# Patient Record
Sex: FEMALE | Race: ASIAN | NOT HISPANIC OR LATINO | Employment: FULL TIME | URBAN - METROPOLITAN AREA
[De-identification: names, ages, dates, MRNs, and addresses within clinical notes are randomized per-mention and may not be internally consistent; named-entity substitution may affect disease eponyms.]

---

## 2023-04-21 LAB
ALANINE AMINOTRANSFERASE (SGPT) (U/L) IN SER/PLAS: 16 U/L (ref 7–45)
ALBUMIN (G/DL) IN SER/PLAS: 4 G/DL (ref 3.4–5)
ALKALINE PHOSPHATASE (U/L) IN SER/PLAS: 45 U/L (ref 33–110)
ALPHA-1 FETOPROTEIN (NG/ML) IN SER/PLAS: <4 NG/ML (ref 0–9)
ASPARTATE AMINOTRANSFERASE (SGOT) (U/L) IN SER/PLAS: 13 U/L (ref 9–39)
BILIRUBIN DIRECT (MG/DL) IN SER/PLAS: 0.2 MG/DL (ref 0–0.3)
BILIRUBIN TOTAL (MG/DL) IN SER/PLAS: 0.9 MG/DL (ref 0–1.2)
HEPATITIS B VIRUS SURFACE AG PRESENCE IN SERUM: REACTIVE
PROTEIN TOTAL: 7 G/DL (ref 6.4–8.2)

## 2023-04-24 LAB — HEPATITIS DELTA VIRUS ANTIBODY: NEGATIVE

## 2023-06-13 ENCOUNTER — TELEPHONE (OUTPATIENT)
Dept: PRIMARY CARE | Facility: CLINIC | Age: 38
End: 2023-06-13

## 2023-07-18 ENCOUNTER — OFFICE VISIT (OUTPATIENT)
Dept: PRIMARY CARE | Facility: CLINIC | Age: 38
End: 2023-07-18
Payer: COMMERCIAL

## 2023-07-18 VITALS
RESPIRATION RATE: 16 BRPM | WEIGHT: 128.4 LBS | BODY MASS INDEX: 22.75 KG/M2 | TEMPERATURE: 97 F | HEART RATE: 58 BPM | OXYGEN SATURATION: 100 % | DIASTOLIC BLOOD PRESSURE: 52 MMHG | HEIGHT: 63 IN | SYSTOLIC BLOOD PRESSURE: 86 MMHG

## 2023-07-18 DIAGNOSIS — Z00.00 ROUTINE HISTORY AND PHYSICAL EXAMINATION OF ADULT: Primary | ICD-10-CM

## 2023-07-18 DIAGNOSIS — L70.9 ACNE, UNSPECIFIED ACNE TYPE: ICD-10-CM

## 2023-07-18 PROBLEM — R04.0 MILD EPISTAXIS: Status: ACTIVE | Noted: 2023-07-18

## 2023-07-18 PROBLEM — N64.4 BREAST PAIN, LEFT: Status: ACTIVE | Noted: 2023-07-18

## 2023-07-18 PROBLEM — H92.20 BLEEDING FROM EAR: Status: ACTIVE | Noted: 2023-07-18

## 2023-07-18 PROBLEM — N39.0 UTI (URINARY TRACT INFECTION): Status: ACTIVE | Noted: 2023-07-18

## 2023-07-18 PROBLEM — N94.10 DYSPAREUNIA, FEMALE: Status: ACTIVE | Noted: 2023-07-18

## 2023-07-18 PROBLEM — M99.09 SEGMENTAL AND SOMATIC DYSFUNCTION: Status: ACTIVE | Noted: 2023-07-18

## 2023-07-18 PROBLEM — R00.2 PALPITATIONS: Status: ACTIVE | Noted: 2023-07-18

## 2023-07-18 PROBLEM — B18.1 CHRONIC HEPATITIS B (MULTI): Status: ACTIVE | Noted: 2023-07-18

## 2023-07-18 PROBLEM — R30.0 DYSURIA: Status: ACTIVE | Noted: 2023-07-18

## 2023-07-18 PROBLEM — R10.13 EPIGASTRIC PAIN: Status: ACTIVE | Noted: 2023-07-18

## 2023-07-18 PROBLEM — R63.4 WEIGHT LOSS, UNINTENTIONAL: Status: ACTIVE | Noted: 2023-07-18

## 2023-07-18 PROBLEM — R00.0 RAPID HEART BEAT: Status: ACTIVE | Noted: 2023-07-18

## 2023-07-18 PROBLEM — R53.83 FATIGUE: Status: ACTIVE | Noted: 2023-07-18

## 2023-07-18 PROBLEM — M79.12 MYALGIA OF AUXILIARY MUSCLES, HEAD AND NECK: Status: ACTIVE | Noted: 2023-07-18

## 2023-07-18 PROBLEM — M79.9 POSTURAL STRAIN: Status: ACTIVE | Noted: 2023-07-18

## 2023-07-18 PROBLEM — M54.2 CERVICALGIA: Status: ACTIVE | Noted: 2023-07-18

## 2023-07-18 PROCEDURE — 99395 PREV VISIT EST AGE 18-39: CPT | Performed by: INTERNAL MEDICINE

## 2023-07-18 PROCEDURE — 1036F TOBACCO NON-USER: CPT | Performed by: INTERNAL MEDICINE

## 2023-07-18 RX ORDER — CLINDAMYCIN PHOSPHATE 10 MG/G
GEL TOPICAL 2 TIMES DAILY
COMMUNITY
Start: 2022-11-28

## 2023-07-18 RX ORDER — TRETINOIN 0.25 MG/G
CREAM TOPICAL
COMMUNITY
Start: 2022-11-03 | End: 2023-07-18 | Stop reason: ALTCHOICE

## 2023-07-18 RX ORDER — ENTECAVIR 0.5 MG/1
1 TABLET, FILM COATED ORAL DAILY
COMMUNITY
Start: 2018-06-18 | End: 2023-10-03 | Stop reason: SDUPTHER

## 2023-07-18 ASSESSMENT — ENCOUNTER SYMPTOMS
MUSCULOSKELETAL NEGATIVE: 1
HEMATOLOGIC/LYMPHATIC NEGATIVE: 1
PALPITATIONS: 0
NEUROLOGICAL NEGATIVE: 1
FREQUENCY: 0
SHORTNESS OF BREATH: 0
COUGH: 0
DYSURIA: 0
CONSTITUTIONAL NEGATIVE: 1
GASTROINTESTINAL NEGATIVE: 1
WHEEZING: 0
EYES NEGATIVE: 1

## 2023-07-18 ASSESSMENT — PATIENT HEALTH QUESTIONNAIRE - PHQ9
2. FEELING DOWN, DEPRESSED OR HOPELESS: NOT AT ALL
SUM OF ALL RESPONSES TO PHQ9 QUESTIONS 1 & 2: 0
1. LITTLE INTEREST OR PLEASURE IN DOING THINGS: NOT AT ALL

## 2023-07-18 ASSESSMENT — LIFESTYLE VARIABLES
SKIP TO QUESTIONS 9-10: 1
HOW OFTEN DO YOU HAVE SIX OR MORE DRINKS ON ONE OCCASION: NEVER
HOW OFTEN DO YOU HAVE A DRINK CONTAINING ALCOHOL: MONTHLY OR LESS
AUDIT-C TOTAL SCORE: 1
HOW MANY STANDARD DRINKS CONTAINING ALCOHOL DO YOU HAVE ON A TYPICAL DAY: 1 OR 2

## 2023-07-18 NOTE — PATIENT INSTRUCTIONS
Aidee was seen today for annual exam.  Diagnoses and all orders for this visit:  Routine history and physical examination of adult (Primary)  -     Basic Metabolic Panel; Future  -     CBC; Future  -     Lipid Panel; Future  -     TSH with reflex to Free T4 if abnormal; Future  Acne, unspecified acne type  -     FSH; Future  -     Luteinizing hormone; Future  -     Estradiol; Future  -     Progesterone; Future     F/U  in 1 yr or before if needed

## 2023-07-18 NOTE — PROGRESS NOTES
Subjective   Patient ID: Aidee Zamorano is a 37 y.o. female who presents for Annual Exam.  The patient is a 38 YO female who is being seen today for a physical. She is also requesting to have labs completed to check her hormones. She has a recent history Acne for which she was seen by Dermatology. She states that the Dermatologist informed her that the cause of her acne was unknown.  The patient believes that hormonal changes are causing her acne.        Review of Systems   Constitutional: Negative.    HENT: Negative.     Eyes: Negative.         Most recent eye exam was March 2023. Wears glasses.   Respiratory:  Negative for cough, shortness of breath and wheezing.    Cardiovascular:  Negative for chest pain, palpitations and leg swelling.   Gastrointestinal: Negative.    Genitourinary:  Negative for dysuria, frequency, vaginal bleeding and vaginal discharge.   Musculoskeletal: Negative.    Skin: Negative.    Neurological: Negative.    Hematological: Negative.        Objective   Physical Exam  Vitals reviewed.   Constitutional:       Appearance: Normal appearance. She is normal weight.   HENT:      Head: Normocephalic and atraumatic.      Right Ear: Tympanic membrane and external ear normal.      Left Ear: Tympanic membrane and external ear normal.   Eyes:      General: No scleral icterus.     Extraocular Movements: Extraocular movements intact.      Conjunctiva/sclera: Conjunctivae normal.      Pupils: Pupils are equal, round, and reactive to light.   Cardiovascular:      Rate and Rhythm: Regular rhythm.      Heart sounds: Normal heart sounds.   Pulmonary:      Effort: Pulmonary effort is normal.      Breath sounds: Normal breath sounds.   Abdominal:      General: Abdomen is flat. Bowel sounds are normal.      Palpations: Abdomen is soft.      Tenderness: There is no abdominal tenderness.   Musculoskeletal:      Cervical back: Normal range of motion and neck supple.   Lymphadenopathy:      Cervical: No cervical  adenopathy.   Skin:     General: Skin is warm and dry.      Findings: Lesion (facial acne on forehead cheeks  and chin) present.   Neurological:      General: No focal deficit present.      Mental Status: She is alert and oriented to person, place, and time.   Psychiatric:         Mood and Affect: Mood normal.         Behavior: Behavior normal.         Assessment/Plan   Aidee was seen today for annual exam.  Diagnoses and all orders for this visit:  Routine history and physical examination of adult (Primary)  -     Basic Metabolic Panel; Future  -     CBC; Future  -     Lipid Panel; Future  -     TSH with reflex to Free T4 if abnormal; Future  Acne, unspecified acne type  -     FSH; Future  -     Luteinizing hormone; Future  -     Estradiol; Future  -     Progesterone; Future     F/U 1 yr or before if needed

## 2023-07-25 ENCOUNTER — LAB (OUTPATIENT)
Dept: LAB | Facility: LAB | Age: 38
End: 2023-07-25
Payer: COMMERCIAL

## 2023-07-25 DIAGNOSIS — Z00.00 ROUTINE HISTORY AND PHYSICAL EXAMINATION OF ADULT: ICD-10-CM

## 2023-07-25 DIAGNOSIS — L70.9 ACNE, UNSPECIFIED ACNE TYPE: ICD-10-CM

## 2023-07-25 LAB
ANION GAP IN SER/PLAS: 11 MMOL/L (ref 10–20)
CALCIUM (MG/DL) IN SER/PLAS: 9.1 MG/DL (ref 8.6–10.6)
CARBON DIOXIDE, TOTAL (MMOL/L) IN SER/PLAS: 26 MMOL/L (ref 21–32)
CHLORIDE (MMOL/L) IN SER/PLAS: 107 MMOL/L (ref 98–107)
CHOLESTEROL (MG/DL) IN SER/PLAS: 172 MG/DL (ref 0–199)
CHOLESTEROL IN HDL (MG/DL) IN SER/PLAS: 55.5 MG/DL
CHOLESTEROL/HDL RATIO: 3.1
CREATININE (MG/DL) IN SER/PLAS: 0.77 MG/DL (ref 0.5–1.05)
ERYTHROCYTE DISTRIBUTION WIDTH (RATIO) BY AUTOMATED COUNT: 12.5 % (ref 11.5–14.5)
ERYTHROCYTE MEAN CORPUSCULAR HEMOGLOBIN CONCENTRATION (G/DL) BY AUTOMATED: 32.7 G/DL (ref 32–36)
ERYTHROCYTE MEAN CORPUSCULAR VOLUME (FL) BY AUTOMATED COUNT: 94 FL (ref 80–100)
ERYTHROCYTES (10*6/UL) IN BLOOD BY AUTOMATED COUNT: 4.26 X10E12/L (ref 4–5.2)
ESTRADIOL (PG/ML) IN SER/PLAS: 48 PG/ML
FOLLITROPIN (IU/L) IN SER/PLAS: 12.9 IU/L
GFR FEMALE: >90 ML/MIN/1.73M2
GLUCOSE (MG/DL) IN SER/PLAS: 90 MG/DL (ref 74–99)
HEMATOCRIT (%) IN BLOOD BY AUTOMATED COUNT: 40.1 % (ref 36–46)
HEMOGLOBIN (G/DL) IN BLOOD: 13.1 G/DL (ref 12–16)
LDL: 105 MG/DL (ref 0–99)
LEUKOCYTES (10*3/UL) IN BLOOD BY AUTOMATED COUNT: 5.6 X10E9/L (ref 4.4–11.3)
LUTEINIZING HORMONE (IU/ML) IN SER/PLAS: 5.6 IU/L
NRBC (PER 100 WBCS) BY AUTOMATED COUNT: 0 /100 WBC (ref 0–0)
PLATELETS (10*3/UL) IN BLOOD AUTOMATED COUNT: 231 X10E9/L (ref 150–450)
POTASSIUM (MMOL/L) IN SER/PLAS: 4 MMOL/L (ref 3.5–5.3)
PROGESTERONE (NG/ML) IN SER/PLAS: 0.5 NG/ML
SODIUM (MMOL/L) IN SER/PLAS: 140 MMOL/L (ref 136–145)
THYROTROPIN (MIU/L) IN SER/PLAS BY DETECTION LIMIT <= 0.05 MIU/L: 4.26 MIU/L (ref 0.44–3.98)
THYROXINE (T4) FREE (NG/DL) IN SER/PLAS: 1.06 NG/DL (ref 0.78–1.48)
TRIGLYCERIDE (MG/DL) IN SER/PLAS: 60 MG/DL (ref 0–149)
UREA NITROGEN (MG/DL) IN SER/PLAS: 10 MG/DL (ref 6–23)
VLDL: 12 MG/DL (ref 0–40)

## 2023-07-25 PROCEDURE — 80061 LIPID PANEL: CPT

## 2023-07-25 PROCEDURE — 85027 COMPLETE CBC AUTOMATED: CPT

## 2023-07-25 PROCEDURE — 83002 ASSAY OF GONADOTROPIN (LH): CPT

## 2023-07-25 PROCEDURE — 36415 COLL VENOUS BLD VENIPUNCTURE: CPT

## 2023-07-25 PROCEDURE — 80048 BASIC METABOLIC PNL TOTAL CA: CPT

## 2023-07-25 PROCEDURE — 83001 ASSAY OF GONADOTROPIN (FSH): CPT

## 2023-07-25 PROCEDURE — 84439 ASSAY OF FREE THYROXINE: CPT

## 2023-07-25 PROCEDURE — 82670 ASSAY OF TOTAL ESTRADIOL: CPT

## 2023-07-25 PROCEDURE — 84144 ASSAY OF PROGESTERONE: CPT

## 2023-07-25 PROCEDURE — 84443 ASSAY THYROID STIM HORMONE: CPT

## 2023-08-02 ENCOUNTER — APPOINTMENT (OUTPATIENT)
Dept: PRIMARY CARE | Facility: CLINIC | Age: 38
End: 2023-08-02
Payer: COMMERCIAL

## 2023-08-02 ENCOUNTER — TELEPHONE (OUTPATIENT)
Dept: PRIMARY CARE | Facility: CLINIC | Age: 38
End: 2023-08-02

## 2023-10-03 DIAGNOSIS — B18.1 CHRONIC VIRAL HEPATITIS B WITHOUT DELTA AGENT AND WITHOUT COMA (MULTI): ICD-10-CM

## 2023-10-03 RX ORDER — ENTECAVIR 0.5 MG/1
0.5 TABLET, FILM COATED ORAL DAILY
Qty: 90 TABLET | Refills: 3 | Status: SHIPPED | OUTPATIENT
Start: 2023-10-03

## 2023-10-16 PROBLEM — L85.3 XEROSIS CUTIS: Status: ACTIVE | Noted: 2021-05-05

## 2023-10-16 PROBLEM — L57.9 SKIN CHANGES DUE TO CHRONIC EXPOSURE TO NONIONIZING RADIATION, UNSPECIFIED: Status: ACTIVE | Noted: 2021-05-05

## 2023-10-16 PROBLEM — L23.89 ALLERGIC CONTACT DERMATITIS DUE TO OTHER AGENTS: Status: ACTIVE | Noted: 2021-05-05

## 2023-10-16 PROBLEM — L70.0 ACNE VULGARIS: Status: ACTIVE | Noted: 2021-05-05

## 2023-10-16 RX ORDER — BENZOYL PEROXIDE 50 MG/ML
LIQUID TOPICAL
COMMUNITY
Start: 2021-05-05 | End: 2023-10-30 | Stop reason: SDUPTHER

## 2023-10-16 RX ORDER — TRETINOIN 0.25 MG/G
1 CREAM TOPICAL
COMMUNITY
Start: 2021-05-05 | End: 2023-10-30 | Stop reason: SDUPTHER

## 2023-10-18 ENCOUNTER — OFFICE VISIT (OUTPATIENT)
Dept: DERMATOLOGY | Facility: CLINIC | Age: 38
End: 2023-10-18
Payer: COMMERCIAL

## 2023-10-18 DIAGNOSIS — L70.0 ACNE VULGARIS: ICD-10-CM

## 2023-10-18 DIAGNOSIS — L57.8 DIFFUSE PHOTODAMAGE OF SKIN: ICD-10-CM

## 2023-10-18 DIAGNOSIS — D48.5 NEOPLASM OF UNCERTAIN BEHAVIOR OF SKIN: Primary | ICD-10-CM

## 2023-10-18 DIAGNOSIS — L81.4 LENTIGO: ICD-10-CM

## 2023-10-18 PROCEDURE — 11301 SHAVE SKIN LESION 0.6-1.0 CM: CPT | Performed by: DERMATOLOGY

## 2023-10-18 PROCEDURE — 1036F TOBACCO NON-USER: CPT | Performed by: DERMATOLOGY

## 2023-10-18 PROCEDURE — 88305 TISSUE EXAM BY PATHOLOGIST: CPT | Mod: TC,DER | Performed by: DERMATOLOGY

## 2023-10-18 PROCEDURE — 99214 OFFICE O/P EST MOD 30 MIN: CPT | Performed by: DERMATOLOGY

## 2023-10-18 PROCEDURE — 88305 TISSUE EXAM BY PATHOLOGIST: CPT | Performed by: DERMATOLOGY

## 2023-10-18 RX ORDER — TRETINOIN 0.5 MG/G
1 CREAM TOPICAL NIGHTLY
Qty: 45 G | Refills: 11 | Status: SHIPPED | OUTPATIENT
Start: 2023-10-18

## 2023-10-18 RX ORDER — BENZOYL PEROXIDE 50 MG/ML
1 LIQUID TOPICAL DAILY
Qty: 227 G | Refills: 11 | Status: SHIPPED | OUTPATIENT
Start: 2023-10-18

## 2023-10-18 RX ORDER — CLINDAMYCIN PHOSPHATE 10 UG/ML
LOTION TOPICAL 2 TIMES DAILY
Qty: 60 ML | Refills: 11 | Status: SHIPPED | OUTPATIENT
Start: 2023-10-18 | End: 2023-10-30 | Stop reason: ALTCHOICE

## 2023-10-18 NOTE — PROGRESS NOTES
Subjective     Aidee Zamorano is a 38 y.o. female who presents for the following: Acne.  She states her acne improved significantly with use of BP wash, clindamycin 1% lotion, and tretinoin 0.025% cream as prescribed at her last visit in our office on 5/5/21.  However, she states she stopped using the medications in early 2022, and her acne began flaring again.  She then restarted clindamycin 1% lotion twice daily 2 months ago, but she is not currently using any other treatment.    She also notes a pink, raised bump on her right breast, which has been present for a few months and has increased in size.    Review of Systems:  No other skin or systemic complaints other than what is documented elsewhere in the note.    The following portions of the chart were reviewed this encounter and updated as appropriate:       Skin Cancer History  No skin cancer on file.    Specialty Problems          Dermatology Problems    Acne vulgaris    Skin changes due to chronic exposure to nonionizing radiation, unspecified    Xerosis cutis       Past Dermatologic / Past Relevant Medical History:    - history of acne  - no h/o skin cancer    Family History:    No family history of skin cancer    Social History:    The patient works as a  for a manufacturing company    Allergies:  Patient has no known allergies.    Current Medications / CAM's:    Current Outpatient Medications:     benzoyl peroxide 5 % external wash, APPLY EXTERNALLY TO THE AFFECTED AREA ONCE DAILY IN THE MORNING, Disp: , Rfl:     benzoyl peroxide 5 % lotion, 1 Application., Disp: , Rfl:     clindamycin (Clindagel) 1 % gel, Apply topically 2 times a day. to affected area, Disp: , Rfl:     entecavir (Baraclude) 0.5 mg tablet, Take 1 tablet (0.5 mg) by mouth once daily., Disp: 90 tablet, Rfl: 3    tretinoin (Retin-A) 0.025 % cream, 1 Application., Disp: , Rfl:      Objective   Well appearing patient in no apparent distress; mood and affect are within normal  limits.    A skin examination was performed including the face and neck and chest. All findings within normal limits unless otherwise noted below.    Assessment/Plan   1. Neoplasm of uncertain behavior of skin  Right Upper Breast  6 mm pink, shiny papule           Shave removal    Lesion length (cm):  0.6  Margin per side (cm):  0  Lesion diameter (cm):  0.6  Informed consent: discussed and consent obtained    Timeout: patient name, date of birth, surgical site, and procedure verified    Procedure prep:  Patient was prepped and draped  Anesthesia: the lesion was anesthetized in a standard fashion    Anesthetic:  1% lidocaine w/ epinephrine 1-100,000 local infiltration  Instrument used: flexible razor blade    Hemostasis achieved with: aluminum chloride    Outcome: patient tolerated procedure well    Post-procedure details: sterile dressing applied and wound care instructions given    Dressing type: bandage and petrolatum      Specimen 1 - Dermatopathology- DERM LAB  Differential Diagnosis: angioma v molluscum v BCC  Check Margins Yes/No?:    Comments:    Dermpath Lab: Routine Histopathology (formalin-fixed tissue)    2. Acne vulgaris  Head - Anterior (Face)  Scattered on the patient's face, there are multiple open and closed comedones; several erythematous, inflammatory papules and pustules; and several pink to hyperpigmented macules consistent with post-inflammatory hyperpigmentation    Acne Vulgaris - flaring on face; mild-moderate; mixed comedonal and inflammatory types.  The nature of this condition and treatment options were discussed extensively with the patient today.  At this time, I recommend combination topical therapy with Benzoyl Peroxide 5% creamy wash once daily in the morning; Clindamycin 1% lotion twice daily or up to 3-4 times per day as needed for active lesions; and Tretinoin 0.05% cream at night.  The risks, benefits, and side effects of these medications, including the redness, dryness, and  irritation expected with Tretinoin use, were discussed; the patient was instructed to begin use of Tretinoin 1-2 nights per week and increase to every other night, then every night as tolerated without excessive redness or irritation.  I also informed the patient it will likely take at least 2-3 months to notice any significant improvement with the treatment regimen outlined above [and she was instructed to discontinue use of these medications should she become or attempt to become pregnant at any time in the future].  The patient expressed understanding and is in agreement with this plan.    3. Lentigo  Photodistributed  Multiple tan- to light brown-colored, round- to oval-shaped, symmetric and uniform-appearing macules and small patches consistent with lentigines scattered in sun-exposed areas.    Solar Lentigines and photodamage.  The clinically benign-appearing nature of these lesions and their relation to chronic sun exposure were discussed with the patient today and reassurance provided.  None of these lesions meet threshold for biopsy today, and thus no treatment is medically indicated for these lesions at this time.  The signs and symptoms of skin cancer were reviewed and the patient was advised to practice sun protection and sun avoidance, use daily sunscreen, and perform regular self skin exams.  The patient was instructed to monitor these lesions for any changes, such as in size, shape, or color, or associated symptoms and to call our office to schedule a return visit for re-evaluation if any such changes or symptoms are noticed in the future.  The patient expressed understanding and is in agreement with this plan.    4. Diffuse photodamage of skin  Photodistributed  Diffuse photodamage with actinic changes with telangiectasia and mottled pigmentation in sun-exposed areas.    Photodamage.  The signs and symptoms of skin cancer were reviewed and the patient was advised to practice sun protection and sun  avoidance, use daily sunscreen, and perform regular self skin exams.  Sun protection was discussed, including avoiding the mid-day sun, wearing a sunscreen with SPF at least 50, and stressing the need for reapplication of sunscreen and applying more than they think they need.

## 2023-10-20 LAB
LABORATORY COMMENT REPORT: NORMAL
PATH REPORT.FINAL DX SPEC: NORMAL
PATH REPORT.GROSS SPEC: NORMAL
PATH REPORT.MICROSCOPIC SPEC OTHER STN: NORMAL
PATH REPORT.RELEVANT HX SPEC: NORMAL
PATH REPORT.TOTAL CANCER: NORMAL

## 2023-10-30 ENCOUNTER — OFFICE VISIT (OUTPATIENT)
Dept: ENDOCRINOLOGY | Facility: CLINIC | Age: 38
End: 2023-10-30
Payer: COMMERCIAL

## 2023-10-30 VITALS
HEART RATE: 80 BPM | BODY MASS INDEX: 22.93 KG/M2 | WEIGHT: 129.4 LBS | SYSTOLIC BLOOD PRESSURE: 98 MMHG | DIASTOLIC BLOOD PRESSURE: 64 MMHG | HEIGHT: 63 IN | RESPIRATION RATE: 14 BRPM

## 2023-10-30 DIAGNOSIS — E03.9 HYPOTHYROIDISM, UNSPECIFIED TYPE: Primary | ICD-10-CM

## 2023-10-30 PROCEDURE — 99204 OFFICE O/P NEW MOD 45 MIN: CPT | Performed by: INTERNAL MEDICINE

## 2023-10-30 PROCEDURE — 1036F TOBACCO NON-USER: CPT | Performed by: INTERNAL MEDICINE

## 2023-10-30 ASSESSMENT — ENCOUNTER SYMPTOMS
HEADACHES: 0
COUGH: 0
FEVER: 0
FATIGUE: 0
SHORTNESS OF BREATH: 0
VOMITING: 0
DIARRHEA: 0
CHILLS: 0
PALPITATIONS: 0
NAUSEA: 0

## 2023-10-30 NOTE — PATIENT INSTRUCTIONS
Blood work in the near future  Plan based on results  Please call or message with concerns or questions

## 2023-10-30 NOTE — PROGRESS NOTES
"Subjective   Patient ID: Aidee Zamorano is a 38 y.o. female who presents for Thyroid Problem. Patient states TSH was abnormal in last blood work at physical. Patient is a self referral.   HPI  38-year-old self-referred for evaluation of hypothyroidism.  She was in for routine physical with her primary care in July and her TSH was noted to be mildly elevated at 4.2.  Her TSH 2 years ago was 2.85.  She denies any previous personal or family history of thyroid disorders.  She has been feeling well.  Her energy levels been fine.  The only thing she has noticed is a slight increase in her weight with a recent visit by a family member and that it is more difficult to lose weight than it has been in the past.  She has regular menstrual cycles.  She has chronic acne issues which have not changed recently.    Review of Systems   Constitutional:  Negative for chills, fatigue and fever.   Respiratory:  Negative for cough and shortness of breath.    Cardiovascular:  Negative for chest pain and palpitations.   Gastrointestinal:  Negative for diarrhea, nausea and vomiting.   Neurological:  Negative for headaches.       Objective    10/30/2023 10:31 AM   BP 98/64   Pulse 80   Resp 14   Weight 58.7 kg (129 lb 6.4 oz)   Height 1.6 m (5' 3\")       Physical Exam  Constitutional:       Appearance: Normal appearance. She is normal weight.   HENT:      Head: Normocephalic and atraumatic.   Neck:      Thyroid: Thyromegaly present. No thyroid mass or thyroid tenderness.      Comments: Mildly enlarged thyroid gland no palpable nodules  Cardiovascular:      Rate and Rhythm: Normal rate and regular rhythm.      Heart sounds: No murmur heard.     No gallop.   Pulmonary:      Effort: Pulmonary effort is normal.      Breath sounds: Normal breath sounds.   Abdominal:      Palpations: Abdomen is soft.      Comments: benign   Neurological:      General: No focal deficit present.      Mental Status: She is alert and oriented to person, place, and time.     "  Deep Tendon Reflexes: Reflexes are normal and symmetric.   Psychiatric:         Behavior: Behavior is cooperative.         Assessment/Plan   Problem List Items Addressed This Visit    None  Visit Diagnoses         Codes    Hypothyroidism, unspecified type    -  Primary E03.9    Relevant Orders    Thyroid Stimulating Hormone    Thyroxine, Free    Triiodothyronine, Free    Thyroid Peroxidase (TPO) Antibody        38-year-old here for evaluation of hypothyroidism.  We discussed her course and blood work in detail.  We discussed hypothyroidism and its treatment.  We went over subclinical hypothyroidism.  We did discuss the importance of treating subclinical hypothyroidism if fertility goals.  She is currently not pursuing pregnancy.  We will recheck her blood work plus Hashimoto's antibodies and reviewed Hashimoto's and evaluation over time.  We will call with her blood work results and then proceed with further evaluation and treatment.  I encouraged her to call or message with concerns or questions

## 2023-11-21 ENCOUNTER — APPOINTMENT (OUTPATIENT)
Dept: INTEGRATIVE MEDICINE | Facility: CLINIC | Age: 38
End: 2023-11-21
Payer: COMMERCIAL

## 2023-11-30 ENCOUNTER — LAB (OUTPATIENT)
Dept: LAB | Facility: LAB | Age: 38
End: 2023-11-30
Payer: COMMERCIAL

## 2023-11-30 DIAGNOSIS — E03.9 HYPOTHYROIDISM, UNSPECIFIED TYPE: ICD-10-CM

## 2023-11-30 PROCEDURE — 84443 ASSAY THYROID STIM HORMONE: CPT

## 2023-11-30 PROCEDURE — 84481 FREE ASSAY (FT-3): CPT

## 2023-11-30 PROCEDURE — 36415 COLL VENOUS BLD VENIPUNCTURE: CPT

## 2023-11-30 PROCEDURE — 86376 MICROSOMAL ANTIBODY EACH: CPT

## 2023-11-30 PROCEDURE — 84439 ASSAY OF FREE THYROXINE: CPT

## 2023-12-01 LAB
T3FREE SERPL-MCNC: 3 PG/ML (ref 2.3–4.2)
T4 FREE SERPL-MCNC: 0.95 NG/DL (ref 0.78–1.48)
THYROPEROXIDASE AB SERPL-ACNC: <28 IU/ML
TSH SERPL-ACNC: 3.04 MIU/L (ref 0.44–3.98)

## 2024-03-01 ENCOUNTER — OFFICE VISIT (OUTPATIENT)
Dept: OBSTETRICS AND GYNECOLOGY | Facility: CLINIC | Age: 39
End: 2024-03-01
Payer: COMMERCIAL

## 2024-03-01 ENCOUNTER — LAB (OUTPATIENT)
Dept: LAB | Facility: LAB | Age: 39
End: 2024-03-01
Payer: COMMERCIAL

## 2024-03-01 VITALS
DIASTOLIC BLOOD PRESSURE: 70 MMHG | HEIGHT: 63 IN | SYSTOLIC BLOOD PRESSURE: 103 MMHG | HEART RATE: 75 BPM | WEIGHT: 132.4 LBS | BODY MASS INDEX: 23.46 KG/M2

## 2024-03-01 DIAGNOSIS — Z00.00 HEALTHCARE MAINTENANCE: ICD-10-CM

## 2024-03-01 DIAGNOSIS — Z00.00 HEALTHCARE MAINTENANCE: Primary | ICD-10-CM

## 2024-03-01 LAB
HIV 1+2 AB+HIV1 P24 AG SERPL QL IA: NONREACTIVE
TREPONEMA PALLIDUM IGG+IGM AB [PRESENCE] IN SERUM OR PLASMA BY IMMUNOASSAY: NONREACTIVE

## 2024-03-01 PROCEDURE — 87624 HPV HI-RISK TYP POOLED RSLT: CPT | Mod: 59 | Performed by: STUDENT IN AN ORGANIZED HEALTH CARE EDUCATION/TRAINING PROGRAM

## 2024-03-01 PROCEDURE — 1036F TOBACCO NON-USER: CPT | Performed by: STUDENT IN AN ORGANIZED HEALTH CARE EDUCATION/TRAINING PROGRAM

## 2024-03-01 PROCEDURE — 87661 TRICHOMONAS VAGINALIS AMPLIF: CPT | Mod: 59 | Performed by: STUDENT IN AN ORGANIZED HEALTH CARE EDUCATION/TRAINING PROGRAM

## 2024-03-01 PROCEDURE — 88175 CYTOPATH C/V AUTO FLUID REDO: CPT | Mod: TC,GCY | Performed by: STUDENT IN AN ORGANIZED HEALTH CARE EDUCATION/TRAINING PROGRAM

## 2024-03-01 PROCEDURE — 99385 PREV VISIT NEW AGE 18-39: CPT | Performed by: STUDENT IN AN ORGANIZED HEALTH CARE EDUCATION/TRAINING PROGRAM

## 2024-03-01 PROCEDURE — 36415 COLL VENOUS BLD VENIPUNCTURE: CPT

## 2024-03-01 PROCEDURE — 87389 HIV-1 AG W/HIV-1&-2 AB AG IA: CPT

## 2024-03-01 PROCEDURE — 86780 TREPONEMA PALLIDUM: CPT

## 2024-03-01 PROCEDURE — 87800 DETECT AGNT MULT DNA DIREC: CPT | Performed by: STUDENT IN AN ORGANIZED HEALTH CARE EDUCATION/TRAINING PROGRAM

## 2024-03-01 ASSESSMENT — PAIN SCALES - GENERAL: PAINLEVEL: 0-NO PAIN

## 2024-03-01 NOTE — ASSESSMENT & PLAN NOTE
- Reviewed healthy lifestyle including well rounded diet and exercise (moderate intensity 150min/week; high intensity 75min/week)  - Immunizations: s/p HPV  - Pap collected, will follow up results with patient via myChart or mail  - STI testing at patient request - GC/CT, Trich, Syphilis, HIV. Pt report she has known Hep B and declines hepatitis testing  - Mammogram at age 41  - Considering oocyte preservation - Referral placed to [facility]

## 2024-03-01 NOTE — PROGRESS NOTES
"Aidee Zamorano is a 38 y.o.  female who is here for a routine exam.   PCP = Radha Luke MD    Subjective     Concerns today: none    Gyn History:  Menarche: age 13  Periods are regular every 28-30 days, lasting 5 days.  Dysmenorrhea: moderate, occurring throughout menses.   Cyclic symptoms include  acne, mood changes .  Sexual activity: not currently  Current contraception: none  Fertility Goals: unsure - considering oocyte preservation  STI History: none  Pap History: 2021 wnl    Preventative:  HPV vaccination: Yes s/p  Exercise: regularly exercises  Diet: well rounded  Seat Belt Use: always    Social History:  Living Situation: lives solo with dog  School/Employment: Works in finance  Substance:    Tob: none   EtOH: at most 1 drink/week   Other Substances: none  Safe at Home?: Yes  Depression Screen/Mood concerns: No         Objective   /70   Pulse 75   Ht 1.6 m (5' 3\")   Wt 60.1 kg (132 lb 6.4 oz)   LMP 2024 (Exact Date)   BMI 23.45 kg/m²   Physical Exam  Vitals reviewed. Exam conducted with a chaperone present.   Constitutional:       Appearance: Normal appearance.   HENT:      Head: Normocephalic.   Cardiovascular:      Rate and Rhythm: Normal rate and regular rhythm.   Pulmonary:      Effort: Pulmonary effort is normal. No respiratory distress.   Chest:   Breasts:     Right: Normal. No swelling, mass or skin change.      Left: Normal. No swelling, mass or skin change.   Abdominal:      General: There is no distension.      Palpations: Abdomen is soft. There is no mass.      Tenderness: There is no abdominal tenderness. There is no guarding or rebound.   Genitourinary:     Comments: Normal appearing external female genitalia. Vaginal mucosa normal appearing without lesions. Cervix nulliparous without lesions.   Lymphadenopathy:      Upper Body:      Right upper body: No supraclavicular, axillary or pectoral adenopathy.      Left upper body: No supraclavicular, axillary or pectoral " adenopathy.   Skin:     General: Skin is warm and dry.   Neurological:      General: No focal deficit present.      Mental Status: She is alert.   Psychiatric:         Mood and Affect: Mood normal.         Behavior: Behavior normal.         Thought Content: Thought content normal.         Judgment: Judgment normal.             Assessment/Plan      Aidee Zamorano is a 38 y.o.  female presenting today for annual exam with the following problems addressed:  Problem List Items Addressed This Visit       Healthcare maintenance - Primary    Current Assessment & Plan     - Reviewed healthy lifestyle including well rounded diet and exercise (moderate intensity 150min/week; high intensity 75min/week)  - Immunizations: s/p HPV  - Pap collected, will follow up results with patient via myChart or mail  - STI testing at patient request - GC/CT, Trich, Syphilis, HIV. Pt report she has known Hep B and declines hepatitis testing  - Mammogram at age 41  - Considering oocyte preservation - Referral placed to [facility]         Relevant Orders    HIV 1/2 Antigen/Antibody Screen with Reflex to Confirmation    Syphilis Screen with Reflex    THINPREP PAP TEST (>30)    Referral to Reproductive Endocrinology

## 2024-03-05 LAB
C TRACH RRNA SPEC QL NAA+PROBE: NEGATIVE
N GONORRHOEA DNA SPEC QL PROBE+SIG AMP: NEGATIVE
T VAGINALIS RRNA SPEC QL NAA+PROBE: NEGATIVE

## 2024-03-15 LAB
CYTOLOGY CMNT CVX/VAG CYTO-IMP: NORMAL
HPV HR 12 DNA GENITAL QL NAA+PROBE: NEGATIVE
HPV HR GENOTYPES PNL CVX NAA+PROBE: NEGATIVE
HPV16 DNA SPEC QL NAA+PROBE: NEGATIVE
HPV18 DNA SPEC QL NAA+PROBE: NEGATIVE
LAB AP HPV GENOTYPE QUESTION: YES
LAB AP HPV HR: NORMAL
LAB AP PAP ADDITIONAL TESTS: NORMAL
LABORATORY COMMENT REPORT: NORMAL
LMP START DATE: NORMAL
PATH REPORT.TOTAL CANCER: NORMAL

## 2024-06-14 ASSESSMENT — LIFESTYLE VARIABLES
HISTORY_ALCOHOL_USE: YES
TOBACCO_USE: NO

## 2024-06-17 ENCOUNTER — CONSULT (OUTPATIENT)
Dept: ENDOCRINOLOGY | Facility: CLINIC | Age: 39
End: 2024-06-17
Payer: COMMERCIAL

## 2024-06-17 VITALS
DIASTOLIC BLOOD PRESSURE: 63 MMHG | TEMPERATURE: 98.6 F | BODY MASS INDEX: 23.23 KG/M2 | HEART RATE: 67 BPM | HEIGHT: 63 IN | WEIGHT: 131.13 LBS | SYSTOLIC BLOOD PRESSURE: 95 MMHG

## 2024-06-17 DIAGNOSIS — Z13.71 SCREENING FOR GENETIC DISEASE CARRIER STATUS: ICD-10-CM

## 2024-06-17 DIAGNOSIS — Z01.83 ENCOUNTER FOR RH BLOOD TYPING: ICD-10-CM

## 2024-06-17 DIAGNOSIS — Z13.29 SCREENING FOR THYROID DISORDER: ICD-10-CM

## 2024-06-17 DIAGNOSIS — B18.1 CHRONIC HEPATITIS B (MULTI): ICD-10-CM

## 2024-06-17 DIAGNOSIS — Z31.89 ENCOUNTER FOR FERTILITY PLANNING: Primary | ICD-10-CM

## 2024-06-17 DIAGNOSIS — Z01.812 ENCOUNTER FOR PREPROCEDURAL LABORATORY EXAMINATION: ICD-10-CM

## 2024-06-17 DIAGNOSIS — O09.529 ANTEPARTUM MULTIGRAVIDA OF ADVANCED MATERNAL AGE (HHS-HCC): ICD-10-CM

## 2024-06-17 DIAGNOSIS — Q51.3 BICORNUATE UTERUS: ICD-10-CM

## 2024-06-17 DIAGNOSIS — Z11.59 ENCOUNTER FOR SCREENING FOR OTHER VIRAL DISEASES: ICD-10-CM

## 2024-06-17 DIAGNOSIS — Z11.3 SCREENING FOR STDS (SEXUALLY TRANSMITTED DISEASES): ICD-10-CM

## 2024-06-17 DIAGNOSIS — Z31.41 FERTILITY TESTING: ICD-10-CM

## 2024-06-17 DIAGNOSIS — Z00.00 HEALTHCARE MAINTENANCE: ICD-10-CM

## 2024-06-17 LAB
ABO GROUP (TYPE) IN BLOOD: NORMAL
ANTIBODY SCREEN: NORMAL
RH FACTOR (ANTIGEN D): NORMAL
TSH SERPL-ACNC: 3.75 MIU/L (ref 0.44–3.98)

## 2024-06-17 PROCEDURE — 87491 CHLMYD TRACH DNA AMP PROBE: CPT

## 2024-06-17 PROCEDURE — 86706 HEP B SURFACE ANTIBODY: CPT

## 2024-06-17 PROCEDURE — 87340 HEPATITIS B SURFACE AG IA: CPT

## 2024-06-17 PROCEDURE — 99214 OFFICE O/P EST MOD 30 MIN: CPT

## 2024-06-17 PROCEDURE — 36415 COLL VENOUS BLD VENIPUNCTURE: CPT

## 2024-06-17 PROCEDURE — 84443 ASSAY THYROID STIM HORMONE: CPT

## 2024-06-17 PROCEDURE — 86850 RBC ANTIBODY SCREEN: CPT

## 2024-06-17 ASSESSMENT — COLUMBIA-SUICIDE SEVERITY RATING SCALE - C-SSRS
2. HAVE YOU ACTUALLY HAD ANY THOUGHTS OF KILLING YOURSELF?: NO
6. HAVE YOU EVER DONE ANYTHING, STARTED TO DO ANYTHING, OR PREPARED TO DO ANYTHING TO END YOUR LIFE?: NO
1. IN THE PAST MONTH, HAVE YOU WISHED YOU WERE DEAD OR WISHED YOU COULD GO TO SLEEP AND NOT WAKE UP?: NO

## 2024-06-17 ASSESSMENT — PATIENT HEALTH QUESTIONNAIRE - PHQ9
SUM OF ALL RESPONSES TO PHQ9 QUESTIONS 1 AND 2: 0
2. FEELING DOWN, DEPRESSED OR HOPELESS: NOT AT ALL
1. LITTLE INTEREST OR PLEASURE IN DOING THINGS: NOT AT ALL

## 2024-06-17 ASSESSMENT — PAIN SCALES - GENERAL: PAINLEVEL: 0-NO PAIN

## 2024-06-17 NOTE — PROGRESS NOTES
Visit Type: In Person    NEW FERTILITY PATIENT VISIT    Referred by:  Dr. Mullen    Accompanied today by:  self    Aidee Zamorano is a 38 y.o.  female who presents to discuss egg preservation or possible IVF with donor Sperm,  h/o elevated TSH level  at 4.26 and repeat testing was negative in  & Chronic Hepatitis B- will have an appt with Dr. Emanuel this week    H/o bicornuate uterus- done in [place] 10 yrs ago    Have you had any concerns about your fertility treatments so far? Have you had any concerns about your fertility treatments so far: No     What are you goals for today's visit?     What causes of infertility have been identified on your workup so far?   no  Past Infertility Treatments: Have you undergone any treatments for fertility: No      Please summarize your fertility treatments to date.   no    PRIOR EVALUATION / TREATMENT: no      Hysterosalpingogram: no  Saline Infused Sonography: no  GYN Pelvic Ultrasound: no      Prior Labs  Lab Results    Date Done      AMH: No results found for requested labs within last 1825 days. No results found for requested labs within last 1825 days.   TSH: 3.04 (Ref range: 0.44 - 3.98 mIU/L) 2023   PRL: No results found for requested labs within last 1825 days. No results found for requested labs within last 1825 days.   Testosterone: No results found for requested labs within last 1825 days. No results found for requested labs within last 1825 days.   DHEAS: No results found for requested labs within last 1825 days. No results found for requested labs within last 1825 days.   FSH: 12.9 (Ref range: IU/L) 2023   17 OHP: No results found for requested labs within last 1825 days. No results found for requested labs within last 1825 days.   HgbA1c: No results found for requested labs within last 1825 days. No results found for requested labs within last 1825 days.   Hepatitis B surface antigen: REACTIVE (A; Ref range: NONREACTIVE) 2023    Hepatitis C antibody: No results found for requested labs within last 1825 days. No results found for requested labs within last 1825 days.   HIV ½ Antigen Antibody screen with reflex: Nonreactive (Ref range: Nonreactive) 3/1/2024   Syphilis screening with reflex: negative 3/1/2024   GC: Negative (Ref range: Negative) 3/1/2024   CT: Negative (Ref range: Negative) 3/1/2024   Type and Screen: No results found for requested labs within last 1825 days. No results found for requested labs within last 1825 days.   Rh: No results found for requested labs within last 1825 days. No results found for requested labs within last 1825 days.   Antibody: No results found for requested labs within last 1825 days. No results found for requested labs within last 1825 days.   Rubella: No results found for requested labs within last 1825 days. No results found for requested labs within last 1825 days.   Varicella: No results found for requested labs within last 1825 days. No results found for requested labs within last 1825 days.   Hemoglobin: No results found for requested labs within last 1825 days. No results found for requested labs within last 1825 days.   Hematocrit: No results found for requested labs within last 1825 days. No results found for requested labs within last 1825 days.   Creatinine: 0.77 (Ref range: 0.50 - 1.05 mg/dL) 2023   AST:13 (Ref range: 9 - 39 U/L) 2023   ALT:16 (Ref range: 7 - 45 U/L): 2023        Relationship Status:  single     OB Hx:      OB History          0    Para   0    Term   0       0    AB   0    Living   0         SAB   0    IAB   0    Ectopic   0    Multiple   0    Live Births   0                 GYN HISTORY   Have you ever been diagnosed with a sexually transmitted disease? Have you ever been diagnosed with a sexually transmitted disease?: No  Hepatitis B  Have you ever had Pelvic Inflammatory Disease? Have you ever had Pelvic Inflammatory Disease?: No  Have  you had an abnormal PAP smear? Have you had an abnormal PAP smear?: No  Date & Result of last PAP smear:   Lab Results   Component Value Date    FINALINTERP  03/01/2024         A. THINPREP PAP CERVIX, SCREENING -     Specimen Adequacy  Satisfactory for evaluation; endocervical/transformation zone component is present  Quality Indicator: Partially obscured by cytolysis    General Categorization  Negative for intraepithelial lesion or malignancy.    Descriptive Interpretation  Negative for intraepithelial lesion or malignancy              Have you ever had an abnormal Mammogram? Have you ever had an abnormal mammogram?: No  Date & result of your last mammogram:  no  Do you have pelvic pain? Do you have pelvic pain?: No  How many times per week do you have intercourse?  NA  Do you have pain with intercourse? Do you have pain with intercourse?: No  Do you use lubricants with intercourse?  NA  Do you have pain with bowel movements? Do you have pain with bowel movements?: No  Do you have pain with a full bladder? Do you have pain with a full bladder?: No    MENSTRUAL HISTORY  LMP: 6-7-2024  Menarche: If applicable, when was your first occurrence of menstruation?: 13 years old  Contraception:  No  Cycle length: What is the average number of days between menstrual cycles?: 26  Describe your bleeding: Describe your bleeding:: Average  Dysmenorrhea: Are your menstrual periods painful?: Yes       ENDOCRINE/INFERTILITY HISTORY  Duration of infertility: If applicable, what is the approximate date you began trying to get pregnant?: Not applicable  Coital Activity/week:  NA  Nipple Discharge: Do you experience any loss of milk or liquid discharge from the breasts?: No  Vision changes: Are you experiencing any vision changes?: No  Headaches: Are you experiencing headaches?: No  Excess hair growth: Are you experiencing persistent or worsening hair growth on the face, breasts or lower abdomen?: No  Excessive hair loss: Are you  experiencing loss of hair from your scalp?: No  Acne: Are you experiencing acne?: Yes  Oily skin: Oily skin?: Yes  Recent weight change  Weight gain: Are you experiencing any increase in weight?: No  Weight loss: Are you experiencing any decrease in weight?: No  Exercise more than 3 times a week: Exercise more than 3 times a week?: Yes      PMH  Past Medical History:   Diagnosis Date    Bicornate uterus 05/02/2017    Bicornate uterus    Chronic viral hepatitis B without delta-agent (Multi) 12/13/2022    Chronic hepatitis B    Other specified abnormal findings of blood chemistry 05/15/2019    Increased prolactin level        MEDICATIONS  Current Outpatient Medications on File Prior to Visit   Medication Sig Dispense Refill    benzoyl peroxide 5 % external wash Apply 1 Application topically once daily. In the morning 227 g 11    benzoyl peroxide 5 % lotion 1 Application.      clindamycin (Clindagel) 1 % gel Apply topically 2 times a day. to affected area      entecavir (Baraclude) 0.5 mg tablet Take 1 tablet (0.5 mg) by mouth once daily. 90 tablet 3    tretinoin (Retin-A) 0.05 % cream Apply 1 Application topically once daily at bedtime. Start 1-2 nights per week 1-2 weeks, inc to every other night, then every night as tolerated 45 g 11     No current facility-administered medications on file prior to visit.        PSH: no  History reviewed. No pertinent surgical history.     PSYCH HISTORY  Have you ever been diagnosed with a mental health Issue?: No  Have you ever been hospitalized for a mental health disorder?: No       SOCIAL HISTORY  Social History     Tobacco Use    Smoking status: Never     Passive exposure: Never    Smokeless tobacco: Never   Vaping Use    Vaping status: Never Used   Substance Use Topics    Alcohol use: Not Currently    Drug use: Never     Occupation: Your occupation:: Finance managar  Have you ever been incarcerated? Have you ever been incarcerated?: No  Do you have a history of domestic  "violence? Do you have a history of domestic violence?: No  Do you feel safe at home? Do you feel safe at home?: Yes  Do you have a history of any negative sexual experience such as incest or rape? Do you have a history of any negative sexual experience such as incest or rape?: No       PARTNER HISTORY: NA  Partner Name:    Partner :    Partner email:    Occupation:    Prior fertility history:    PMH:    PSH:    Smoking:   Alcohol Use:    Drug Use:    Medications:    Injuries:    STD: Have you ever been diagnosed with a sexually transmitted disease?: No  SA:    SA Results:      FAMILY HISTORY  No family history on file.    CANCER HISTORY    Breast:  no  Ovarian:  no  Colon:  no  Endometrial:  no    FAMILY VTE HISTORY  Family History of Blood Clots:  no    GENETIC HISTORY  Ethnic Background  Patient: Ethnic Background Patient:: [place]   Partner:  NA  Genetic Disease in Family  Patient: Patient: Defects and genetic syndromes? Please answer Yes, No or Unsure: No  Partner:    Birth Defects in Family  Patient: Patient: Birth defects? Please answer Yes, No or Unsure: No  Partner: NA    Genetic screening performed previously:  no     BMI:   BMI Readings from Last 1 Encounters:   24 23.23 kg/m²     VITALS:  BP 95/63   Pulse 67   Temp 37 °C (98.6 °F)   Ht 1.6 m (5' 3\")   Wt 59.5 kg (131 lb 2 oz)   LMP 2024 (Approximate)   BMI 23.23 kg/m²     ASSESSMENT   38 y.o.  female with desire for egg preservation vs IVF with donor sperm, suspected ovulation and the following pertinent medical issues: single, chronic hep b, and poss bicornuate.    Partner SA: NA    COUNSELING  We discussed causes of infertility including hormonal, egg quality issues, structural problems such as endometriosis, adhesions, or tubal problems, uterine factors such as polyps or fibroids, and sperm issues. Reviewed evaluation of such as well. We discussed various methods for achieving pregnancy in some detail including, ovulation " "induction, insemination, superovulation and IVF.    We discussed the impact of age on fertility. We discussed that a woman is born with all of the follicles that she will have in her lifetime and that these numbers progressively decrease as the patient reaches menopause. We discussed that women can remain fertile into their late 30’s and even early 40’s, however, chance for success is significantly lower for women who have infertility. We also discussed the higher rates of aneuploidy and miscarriage that occur as women age.    We discussed AMH testing and the patient's AMH level, if applicable.  AMH is considered favorable if >1 however this test has many limitations including poor predictive rates of pregnancy. In randomized control trials such as \"Time to conceive\" pts with low AMH levels (<0.7) has similar cumulative pregnancy rates compared with women that had normal AMH levels.  In the \"AMIGOS\" study, AMH did not predict pregnancy rates following OS/IUI for unexplained infertility. However,  AMH is a marker that is helpful to predict how a patient may respond or oocyte yields with FSH and ART treatments, but again there is conflicting data about how this affects pregnancy rates with ART.    We discussed options for fertility preservation including oocyte cryopreservation and embryo cryopreservation, and discussed advances in cryopreservation specifically the optimization of vitrification to enhance oocyte freezing and enhance the likelihood of pregnancies after thaw.  Discussed with patient the clinical data that currently exists about efficacy of oocyte cryopreservation as a strategy for fertility preservation and that this is an evolving area. At present several concepts have been demonstrated. Increased maternal age likely impacts total egg yield and likelihood of pregnancy after thaw. Offspring outcomes appear to be comparable for children conceived after IVF and oocyte freezing/thaw/IVF but research in " this area is evolving and much more outcomes data exists for children conceived after embryo cryo than oocyte cryo. An upper limit on duration of time that oocytes can be frozen and still result in a pregnancy has not been defined. There is no guarantee for a pregnancy with any amount of oocytes cryopreserved. Reviewed nature of stimulation, injectable hormones used, and monitoring process and the process of oocyte retrieval as an outpatient surgical procedure to harvest oocytes. Risks of this procedure include ovarian hyperstimulation syndrome, anesthesia risks during egg retrieval. Reviewed need to consult with financial specialists in our office to delineate coverage and costs.    FEMALE SUPPLEMENTS FOR EGG QUALITY  The supplements that are marketed for egg quality are:  1) Vitamin D - 4000 units daily  2) Co enzyme Q10- 600mg/day (also called ubiquinone)    DONOR SPERM FACILITIES*     For your convenience, below is a list of commonly used donor sperm facilities by patients at  [facility].   The facilities listed below adhere to CLIA guidelines and are FDA approved for .     [facility]   [facility]   [facility]   [facility]   [facility]   [facility]   [facility]     If the facility you wish to use is not listed, you may request a review by the  and third party manager. Approval is not guaranteed and is granted on a case-by-case basis.   The donor of your choice will be reviewed by our third party team. Upon approval, please confirm with your clinical team before shipping. Samples and/or donors that have not been approved may not be accepted.      * Please note that [facility] has no affiliation or agency relationship with any of the donor facilities listed above, and it disclaims any and all responsibility for  "tissue or other property transferred from such storage facilities.      Reviewed with pt that CMV is a common virus and that the Center for Disease Control estimates that over half of adults have been infected with CMV by age 40, most with no signs or symptoms. CMV is transmitted via body fluids (saliva, urine, blood, tears, semen, and breast milk).   For this reason the \"sperm source\" needs to be assessed for CMV. Although, the risk of CMV transmission from washed sperm and/or embryos is very low.   Reviewed with pt risk of Congenital CMV. Reviewed that babies born with CMV can have brain, liver, spleen, lung, and growth problems. The most common long-term health problem in babies born with congenital CMV infection is hearing loss.      A complete cycle includes all fresh and frozen-thawed embryo transfers resulting from one egg collection.  Your chance of having your first baby after the 1st complete cycle of IVF is: 41.21%. This means that out of 100 couples undertaking a 1st complete cycle, approximately 41 would have a baby. 2nd IVF cycle 58%.      Routine Testing  Fertility Center  STDs Within 1 year   Genetic carrier Waiver/Completed   T&S Within 1 year   AMH Within 1 year   TSH Within 1 year   Rubella/Varicella Within 5 years     PLAN  DR. Emanuel will order Hep B panel and Ultrasound liver  Hep C, HIV, Syphilis, GC/CT   Type & Screen  CMV IgG/IgM  TSH  AMH  Rubella/varicella  Myriad  SIS- call day 1 of next menses to schedule- poss bicornuate uterus  Schedule IVF Consult for egg preservation vs IVF with Donor Sperm  Schedule MFM for Chronic Hep B, AMA, & Bicornuate Uterus  Psych referral for Donor Sperm- DR. Amin  Financial consult  Will need to be cleared by Dr. Abdifatah Emanuel for Hep B  Chart to primary nurse for care coordination and patient check list/education  Enroll in Engaged MD  Take prenatal vitamins, vitamin D 2000 IUs daily  Discussed that PAP and mammogram must be updated if appropriate " based on age and clinical history and results received before treatment can begin- up to date for pap test  Discussed that treatment cannot proceed until checklist items are complete   See me for a follow up visit in 6 weeks or after all testing is complete    MD Completion:  Ectopic Risk: No  Medically Complex: Yes- chronic Hep B & possible bicornuate uterus (diagnosed in [place] 10 yrs ago, no documentation)      Sneha Schmitz CNP 06/17/24 9:24 AM

## 2024-06-18 LAB
C TRACH RRNA SPEC QL NAA+PROBE: NEGATIVE
CMV IGG AVIDITY SERPL IA-RTO: REACTIVE %
HCV AB SER QL: NONREACTIVE
HIV 1+2 AB+HIV1 P24 AG SERPL QL IA: NONREACTIVE
N GONORRHOEA DNA SPEC QL PROBE+SIG AMP: NEGATIVE
RUBV IGG SERPL IA-ACNC: 3.3 IA
RUBV IGG SERPL QL IA: POSITIVE
TREPONEMA PALLIDUM IGG+IGM AB [PRESENCE] IN SERUM OR PLASMA BY IMMUNOASSAY: NONREACTIVE
VARICELLA ZOSTER IGG INDEX: 1.2 IA
VZV IGG SER QL IA: POSITIVE

## 2024-06-19 LAB
CMV IGM SERPL-ACNC: <8 AU/ML
MIS SERPL-MCNC: 2.33 NG/ML (ref 0.18–11.71)

## 2024-06-20 ENCOUNTER — APPOINTMENT (OUTPATIENT)
Dept: GASTROENTEROLOGY | Facility: CLINIC | Age: 39
End: 2024-06-20
Payer: COMMERCIAL

## 2024-06-20 ENCOUNTER — TELEPHONE (OUTPATIENT)
Dept: DERMATOLOGY | Facility: CLINIC | Age: 39
End: 2024-06-20

## 2024-06-20 VITALS
WEIGHT: 128 LBS | HEART RATE: 76 BPM | HEIGHT: 63 IN | BODY MASS INDEX: 22.68 KG/M2 | OXYGEN SATURATION: 98 % | DIASTOLIC BLOOD PRESSURE: 58 MMHG | SYSTOLIC BLOOD PRESSURE: 100 MMHG

## 2024-06-20 DIAGNOSIS — B18.1 CHRONIC HEPATITIS B (MULTI): ICD-10-CM

## 2024-06-20 LAB
AFP SERPL-MCNC: <4 NG/ML (ref 0–9)
HBV SURFACE AB SER-ACNC: <3.1 MIU/ML
HBV SURFACE AG SERPL QL IA: REACTIVE

## 2024-06-20 PROCEDURE — 1036F TOBACCO NON-USER: CPT | Performed by: INTERNAL MEDICINE

## 2024-06-20 PROCEDURE — 99215 OFFICE O/P EST HI 40 MIN: CPT | Performed by: INTERNAL MEDICINE

## 2024-06-20 ASSESSMENT — PAIN SCALES - GENERAL: PAINLEVEL: 0-NO PAIN

## 2024-06-20 NOTE — PATIENT INSTRUCTIONS
Get labs today, in 6 months and in 12 months.    Get an ultrasound of the liver now, in 6 months and in 12 months.    Get a FibroScan ultrasound of the liver.    Continue the Entecavir.    See us back in about 1 year.

## 2024-06-20 NOTE — PROGRESS NOTES
HEPATOLOGY RETURN PATIENT VISIT    June 20, 2024      Dr. Radha Luke    Patient Name:  ISABEL ZAMORANO  Medical Record Number: [id number]  YOB: 1985    Dear Dr. Luke,    I had the pleasure of seeing Isabel Zamorano for follow-up evaluation in the [facility] ([place] [facility] office). History and physical examination was performed. Pertinent available laboratory, imaging, pathology results were reviewed.     She has not seen me in clinic since 12/13/2022.    History of Present Illness:   The patient is a 38 year old Chinese female who is referred for follow-up evaluation of hepatitis B.    The patient is from [place].  She was diagnosed with hepatitis B at age 4.  She reports that she was always hepatitis B e Ag negative. She reports that she was asymptomatic and required no therapy from age 4 until 2010.  In 2010, she was undergoing routine labs and was told that her ALT was approximately 600 and her hepatitis B DNA was greater than 10,000,000 IU/ml.  At that time she was placed on entecavir 0.5 mg daily.  She has remained on that medicine since September 2010.    She had been getting labs in [place] every 3 months.  She had also been getting intermittent ultrasounds.  She had never had a liver biopsy.    She has never had any manifestation of liver disease including no ascites, hepatic encephalopathy, variceal bleeding, or jaundice.     Her father has hepatitis B.  Her mother and her siblings are all negative for hepatitis B.  Her  was reportedly negative for hepatitis B. He has received his HBV vaccines and they have no children.    She moved to the [place] in 2015 to pursue an DADA degree at [employer].  She initially saw me in December 2015 to get refills of her entecavir.  At that time I did additional lab tests and an ultrasound.  We did refill her entecavir.    She had had some early satiety and dyspepsia over the last year.  She was seen by Veronica Waldron NP for this.  The  symptoms all resolved.    She saw me in clinic 9/2/2020.  At that time, I told her to get labs and an ultrasound every 6 months and see me back in 1 year.  Instead, she did not return for follow-up > 2 years later.  She saw me in clinic 12/13/2022.  I again told her to get labs and an ultrasound every 6 months and see me back in 1 year.  She now returns about 18 months later.    She is being seen by reproductive endocrinology and infertility for possible in vitro fertilization.  She came in today to discuss pregnancy and hepatitis B.    She presented today for follow-up evaluation.  She denied any specific liver-related complaints.  She remains on entecavir.  She reports that she is compliant with the medication.    Past Medical/Surgical History:   HBV.    Family History:   Dad with HBV.    Social History:   She denied alcohol, tobacco, intravenous drug use, blood transfusions, or tattoos.  She completed her DADA at [employer] and now works in [employer] in [place]. She is . Her ex- had been vaccinated against hepatitis B. She is currently in a sexual relationship.    Review of systems: As noted above.  She has had no nausea, vomiting, abdominal pain, or gastrointestinal bleeding.  No fever, chills, visual changes, auditory changes, shortness of breath, chest pain, GI bleeding, diarrhea, constipation, dysuria, depression, hematuria, musculoskeletal issues or rash.    Medical, Surgical, Family, and Social Histories  Past Medical History:   Diagnosis Date    Bicornate uterus 05/02/2017    Bicornate uterus    Chronic viral hepatitis B without delta-agent (Multi) 12/13/2022    Chronic hepatitis B    Other specified abnormal findings of blood chemistry 05/15/2019    Increased prolactin level       No past surgical history on file.    No family history on file.    Social History     Socioeconomic History    Marital status: Single     Spouse name: Not on file    Number of children: Not on file    Years of education:  "Not on file    Highest education level: Not on file   Occupational History    Not on file   Tobacco Use    Smoking status: Never     Passive exposure: Never    Smokeless tobacco: Never   Vaping Use    Vaping status: Never Used   Substance and Sexual Activity    Alcohol use: Not Currently    Drug use: Never    Sexual activity: Not on file   Other Topics Concern    Not on file   Social History Narrative    Not on file     Social Determinants of Health     Financial Resource Strain: Not on file   Food Insecurity: Not on file   Transportation Needs: Not on file   Physical Activity: Not on file   Stress: Not on file   Social Connections: Not on file   Intimate Partner Violence: Not on file   Housing Stability: Not on file       Allergies and Current Medications  No Known Allergies  Current Outpatient Medications   Medication Sig Dispense Refill    benzoyl peroxide 5 % external wash Apply 1 Application topically once daily. In the morning 227 g 11    benzoyl peroxide 5 % lotion 1 Application.      clindamycin (Clindagel) 1 % gel Apply topically 2 times a day. to affected area      entecavir (Baraclude) 0.5 mg tablet Take 1 tablet (0.5 mg) by mouth once daily. 90 tablet 3    tretinoin (Retin-A) 0.05 % cream Apply 1 Application topically once daily at bedtime. Start 1-2 nights per week 1-2 weeks, inc to every other night, then every night as tolerated 45 g 11     No current facility-administered medications for this visit.        Physical Exam  /58   Pulse 76   Ht 1.6 m (5' 3\")   Wt 58.1 kg (128 lb)   LMP 06/07/2024 (Approximate)   SpO2 98%   BMI 22.67 kg/m²       Physical Examination:   General Appearance: alert and in no acute distress.   HEENT: oropharynx without lesions. Anicteric sclerae.   Neck supple, nontender, without adenopathy, thyromegaly, or JVD.   Lungs clear to auscultation and percussion.   Heart RRR without murmurs, rubs, or gallops.   Abdomen: Soft, nontender, bowel sounds positive, without " obvious ascites. Liver and spleen not palpable.   Extremities full ROM, no atrophy, normal strength. No edema.   Skin reveals no lesions.  Neurological exam nonfocal, alert and oriented.  No asterixis.  No spider angiomata, or palmar erythema.     Labs 6/17/2024 syphilis negative, HIV negative, hepatitis C antibody negative.    Labs 3/1/2024 syphilis negative, HIV negative.    Labs 11/30/2023 TSH 3.04.    Labs 7/25/2023 WBC 5.6, hemoglobin 13.1, platelets 231, glucose 90, sodium 140, creatinine 0.77.    Labs 4/21/2023 AFP < 4, hepatitis B surface antigen positive, hepatitis delta antibody negative, protein 7, albumin 4, alk phos 45, bilirubin 0.9, AST 13, ALT 16.    Labs 8/12/2022 glucose 87, sodium 137, creatinine 0.75, cholesterol 193, , triglycerides 46, WBC 7.1, hemoglobin 13, platelets 231, hepatitis B DNA undetectable.    Labs 9/21/2021 hepatitis B DNA undetectable, protein 7.5, albumin 4.4, alk phos 50, bilirubin 0.6, AST 17, ALT 21.    Labs 8/3/2020 alpha-1 antitrypsin 130, protein 7.4, albumin 4.2, alkaline phosphatase 47, bilirubin 1.2, AST 12, ALT 10, hepatitis B surface antigen positive, hepatitis B surface antibody negative, hepatitis B DNA undetectable.    Labs 8/30/2019 HBV-DNA detected but < 20 IU/ml, AST 13, ALT 14.    Labs 12/20/2018 creatinine 0.7, glucose 96, protein 7.4, albumin 4.4, alkaline phosphatase 61, bilirubin 0.7, AST 12, ALT 14, INR 1.1, WBC 6.2, hemoglobin 14.1, platelets 281.    Labs 10/26/2018 hepatitis B DNA undetectable, AFP < 1, AST 13, ALT 15.    H. pylori breath test 10/26/2018 negative.    Labs 3/30/2018 hepatitis B DNA undetectable.    Labs 11/14/2017 HBV-DNA undetectable, cholesterol 192, triglycerides 63, glucose 92, creatinine 0.69, protein 6.9, albumin 4, alkaline phosphatase 51, bilirubin 0.8, AST 12, ALT 12.    Labs 5/3/2017 HBV-DNA detected but below the level of quantification.    Labs 9/9/2016 AFP < 1.    Labs 12/15/2015 HBV-DNA + but below the level of  quantification, HAV +, AFP 2, HCV -, HBsAb -, HBsAg +, glucose 89, creatinine 0.63, protein 8, albumin 4.1, alkaline phosphatase 76, bilirubin 0.8, AST 16, ALT 32.    Labs 8/28/2015 HBV-DNA -, AST 25, ALT 34, alk phos 76, bilirubin normal, protein 7.7, albumin 4.21.    Labs 2010 revealed hepatitis B surface antigen positive, hepatitis B core antibody positive, hepatitis B e antigen negative, hepatitis B e antigen antibody positive.    Ultrasound 9/20/2023:  Impression   NORMAL SONOGRAPHIC APPEARANCE OF THE RIGHT UPPER QUADRANT.         Ultrasound 4/9/2021 revealed an unremarkable ultrasound of the right upper quadrant.    US 8/31/2020:  IMPRESSION:  Unremarkable ultrasound of the right upper quadrant.    US 12/19/2019:  IMPRESSION:  Unremarkable ultrasound of the right upper quadrant.    US 2/7/2018:  IMPRESSION:  Unremarkable ultrasound of the liver.     US 3/15/2017:  IMPRESSION:  Unremarkable ultrasound of the liver.    US 12/23/2015:  IMPRESSION:     1. Unremarkable right upper quadrant ultrasound.    EGD 9/23/2019:  Impression:         - Normal esophagus.                      - Z-line regular, 40 cm from the incisors.                      - Non-erosive gastritis. Biopsied.                      - Normal examined duodenum.        Assessment/Plan:   Hepatitis B    The patient is referred to me for follow-up evaluation of hepatitis B.    As noted above, she is HBeAg negative.  Apparently, when she was felt to have a flare of her disease in [place] in 2010, she was started on entecavir 0.5 mg daily.  She has remained on that dose since 2010.  She was told that she would need to stay on this medication indefinitely.    Obviously, I cannot go back in time to determine exactly the circumstances of that flare.  It is possible this could have been an immunologic event and did not need therapy.  However, now that she has been on this therapy since 2010 and is doing well, I would just continue it indefinitely.  Obviously, if  she were to lose her surface antigen, we could consider trying to stop therapy.  I will check hepatitis B surface antigen and hepatitis B surface antibody annually.    I will also check hepatitis B DNA annually (although I would do it every 6 months for now in case she gets pregnant).    She should get labs every 6 months as well as an alpha-fetoprotein and ultrasound every 6 months.      I did remind her of the importance of compliance, especially with the liver cancer screening tests.  She has not been getting her labs or imaging studies on a consistent basis as noted above.    Her hepatitis C antibody was negative.  Her hepatitis D antibody was also negative.    She is immune to hepatitis A.    She should see me back on an annual basis.    She was asking if she can drink alcohol in moderation.  I told her that occasional alcohol should be fine.    I again spoke with her about household and sexual transmission of hepatitis B.  I did recommend that sexual partners be told about her hepatitis B and that they be recommended to discuss vaccination with their care providers.    She is currently exploring in vitro fertilization.  We spoke at length about the risk of spread to the unborn child.  In fact, with her hepatitis B DNA being essentially negative, the wrist should be very small.  Either way, the OB and/or neonatologist can give the baby hepatitis B immunoglobulin and vaccines at the time of birth.    We did talk about the safety of hepatitis B medications.  We do not have massive data on the safety of hepatitis B medications in pregnancy.  However, from what we know, there should be little risk associated with the hepatitis B medications during pregnancy.  Also, given the fact that she had a significant flare in 2010, I would not feel comfortable stopping her hepatitis B medication.  After our lengthy discussion, she has opted to stay on her Entecavir.    We have never done a FibroScan on her.  I will recommend  that we get one as a baseline study.    Thank you for allowing me to participate in the care of this patient. Please feel free to contact me with any questions regarding their care.     Sincerely,     Abdifatah Emanuel MD, FAASLD, FACG.   Medical Director, Hepatology  [facility]  [facility]    Professor of Medicine  Division of Gastroenterology and Liver Disease  [employer]    [address] [place] [id number]  Phone: [phone]  Fax: [phone].            This document was generated with a computerized dictation system.  Because of this, there could be errors in grammar and/or content.

## 2024-06-20 NOTE — TELEPHONE ENCOUNTER
Pt left VM that she will be starting IVF within few months of this year ,, wants to know when she should stop using Treinoin ?

## 2024-06-25 ENCOUNTER — DOCUMENTATION (OUTPATIENT)
Dept: ENDOCRINOLOGY | Facility: CLINIC | Age: 39
End: 2024-06-25
Payer: COMMERCIAL

## 2024-06-25 NOTE — PROGRESS NOTES
Message to patient regarding [facility] Screening:  Genetic carrier testing reviewed    [   ] Genetic carrier testing reviewed and POSITIVE result noted, indicating that the patient a carrier of one or more genetic conditions.     [ X  ] Genetic carrier testing reviewed and NEGATIVE result noted.    Additional actions:  [   ] Ok to proceed with next steps, no additional genetic testing or counseling recommended  [  X ] Awaiting partner testing  [   ] Partner testing reviewed and no concordance. Ok to proceed with planned treatments.   [  X ] Other: if patient is using donor sperm to fertilize eggs, then we will need to be sure that she is screened and negative for any genetic condition the sperm donor is a carrier for. Sneha Schmitz, MATIAS 06/25/24 3:01 PM

## 2024-06-27 ENCOUNTER — APPOINTMENT (OUTPATIENT)
Dept: OPHTHALMOLOGY | Facility: CLINIC | Age: 39
End: 2024-06-27
Payer: COMMERCIAL

## 2024-07-03 ENCOUNTER — LAB (OUTPATIENT)
Dept: LAB | Facility: LAB | Age: 39
End: 2024-07-03
Payer: COMMERCIAL

## 2024-07-03 DIAGNOSIS — B18.1 CHRONIC HEPATITIS B (MULTI): ICD-10-CM

## 2024-07-03 PROCEDURE — 87517 HEPATITIS B DNA QUANT: CPT

## 2024-07-03 PROCEDURE — 36415 COLL VENOUS BLD VENIPUNCTURE: CPT

## 2024-07-04 LAB
HBV DNA SERPL NAA+PROBE-ACNC: NOT DETECTED [IU]/ML
HBV DNA SERPL NAA+PROBE-LOG IU: NORMAL {LOG_IU}/ML

## 2024-07-15 ENCOUNTER — APPOINTMENT (OUTPATIENT)
Dept: PRIMARY CARE | Facility: CLINIC | Age: 39
End: 2024-07-15
Payer: COMMERCIAL

## 2024-07-15 VITALS
HEIGHT: 63 IN | WEIGHT: 132 LBS | DIASTOLIC BLOOD PRESSURE: 68 MMHG | BODY MASS INDEX: 23.39 KG/M2 | SYSTOLIC BLOOD PRESSURE: 97 MMHG

## 2024-07-15 DIAGNOSIS — Z00.00 HEALTH MAINTENANCE EXAMINATION: Primary | ICD-10-CM

## 2024-07-15 PROBLEM — L57.9 SKIN CHANGES DUE TO CHRONIC EXPOSURE TO NONIONIZING RADIATION, UNSPECIFIED: Status: RESOLVED | Noted: 2021-05-05 | Resolved: 2024-07-15

## 2024-07-15 PROBLEM — R30.0 DYSURIA: Status: RESOLVED | Noted: 2023-07-18 | Resolved: 2024-07-15

## 2024-07-15 PROBLEM — R00.0 RAPID HEART BEAT: Status: RESOLVED | Noted: 2023-07-18 | Resolved: 2024-07-15

## 2024-07-15 PROBLEM — N39.0 UTI (URINARY TRACT INFECTION): Status: RESOLVED | Noted: 2023-07-18 | Resolved: 2024-07-15

## 2024-07-15 PROBLEM — R00.2 PALPITATIONS: Status: RESOLVED | Noted: 2023-07-18 | Resolved: 2024-07-15

## 2024-07-15 PROBLEM — M99.09 SEGMENTAL AND SOMATIC DYSFUNCTION: Status: RESOLVED | Noted: 2023-07-18 | Resolved: 2024-07-15

## 2024-07-15 PROBLEM — R10.13 EPIGASTRIC PAIN: Status: RESOLVED | Noted: 2023-07-18 | Resolved: 2024-07-15

## 2024-07-15 PROBLEM — M79.12 MYALGIA OF AUXILIARY MUSCLES, HEAD AND NECK: Status: RESOLVED | Noted: 2023-07-18 | Resolved: 2024-07-15

## 2024-07-15 PROBLEM — M79.9 POSTURAL STRAIN: Status: RESOLVED | Noted: 2023-07-18 | Resolved: 2024-07-15

## 2024-07-15 PROBLEM — R53.83 FATIGUE: Status: RESOLVED | Noted: 2023-07-18 | Resolved: 2024-07-15

## 2024-07-15 PROBLEM — L85.3 XEROSIS CUTIS: Status: RESOLVED | Noted: 2021-05-05 | Resolved: 2024-07-15

## 2024-07-15 PROBLEM — R04.0 MILD EPISTAXIS: Status: RESOLVED | Noted: 2023-07-18 | Resolved: 2024-07-15

## 2024-07-15 PROBLEM — L70.0 ACNE VULGARIS: Status: RESOLVED | Noted: 2021-05-05 | Resolved: 2024-07-15

## 2024-07-15 PROBLEM — M54.2 CERVICALGIA: Status: RESOLVED | Noted: 2023-07-18 | Resolved: 2024-07-15

## 2024-07-15 PROBLEM — N94.10 DYSPAREUNIA, FEMALE: Status: RESOLVED | Noted: 2023-07-18 | Resolved: 2024-07-15

## 2024-07-15 PROBLEM — N64.4 BREAST PAIN, LEFT: Status: RESOLVED | Noted: 2023-07-18 | Resolved: 2024-07-15

## 2024-07-15 PROBLEM — H92.20 BLEEDING FROM EAR: Status: RESOLVED | Noted: 2023-07-18 | Resolved: 2024-07-15

## 2024-07-15 PROBLEM — R63.4 WEIGHT LOSS, UNINTENTIONAL: Status: RESOLVED | Noted: 2023-07-18 | Resolved: 2024-07-15

## 2024-07-15 PROCEDURE — 99395 PREV VISIT EST AGE 18-39: CPT

## 2024-07-15 PROCEDURE — 1036F TOBACCO NON-USER: CPT

## 2024-07-15 ASSESSMENT — LIFESTYLE VARIABLES: HOW MANY STANDARD DRINKS CONTAINING ALCOHOL DO YOU HAVE ON A TYPICAL DAY: PATIENT DOES NOT DRINK

## 2024-07-15 NOTE — PROGRESS NOTES
"Subjective   Patient ID: Aidee Zamorano is a 38 y.o. female who presents for Clinic new est.  HPI  38 year old female with PMH of hepatitis B presents today to establish care.   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     Diet: Adequate fruits and and vegetables, protein, calcium   Exercise: Ac Chi class 3-4 days a week.   Nicotine: None  ETOH: None  Drug use: None  Dental care: UTD, no major issues  Vision concerns: Glasses, vision stable  Hearing concerns: none    All systems have been reviewed and are negative for complaint other than those mentioned in the HPI.     BP 97/68 (BP Location: Right arm, Patient Position: Sitting, BP Cuff Size: Adult)   Ht 1.6 m (5' 3\")   Wt 59.9 kg (132 lb)   LMP 06/07/2024 (Approximate)   BMI 23.38 kg/m²    Objective   Physical Exam  Constitutional:       General: She is awake.      Appearance: Normal appearance.   HENT:      Head: Normocephalic and atraumatic.   Eyes:      Extraocular Movements: Extraocular movements intact.      Conjunctiva/sclera: Conjunctivae normal.      Pupils: Pupils are equal, round, and reactive to light.   Neck:      Thyroid: No thyroid mass or thyromegaly.      Vascular: No carotid bruit.   Cardiovascular:      Rate and Rhythm: Normal rate and regular rhythm.      Pulses: Normal pulses.      Heart sounds: S1 normal and S2 normal. No murmur heard.  Pulmonary:      Effort: Pulmonary effort is normal.      Breath sounds: Normal breath sounds.   Abdominal:      General: Abdomen is flat. Bowel sounds are normal.      Palpations: Abdomen is soft. There is no hepatomegaly, splenomegaly, mass or pulsatile mass.      Tenderness: There is no abdominal tenderness.   Musculoskeletal:      Cervical back: Normal range of motion and neck supple.      Right lower leg: No edema.      Left lower leg: No edema.   Skin:     General: Skin is warm and dry.      Capillary Refill: " Capillary refill takes less than 2 seconds.   Neurological:      General: No focal deficit present.      Mental Status: She is alert and oriented to person, place, and time.   Psychiatric:         Mood and Affect: Mood normal.         Behavior: Behavior normal. Behavior is cooperative.         Thought Content: Thought content normal.         Judgment: Judgment normal.     Aidee was seen today for clinic new est.  Diagnoses and all orders for this visit:  Health maintenance examination (Primary)  -    In usual state of health, no concerns today  - UTD immunizations  - UTD pap  - Start mammogram screening at 40, colon cancer screening at 45  - Continue exercise, balanced diet. No significant calcium source in diet, discussed starting supplement.   - Due for  lab work, ordered.   -  Lipid Panel; Future  -     Hemoglobin A1C; Future  -     CBC and Auto Differential; Future  -     Vitamin D 25-Hydroxy,Total (for eval of Vitamin D levels); Future  -     Tsh With Reflex To Free T4 If Abnormal; Future  -     Comprehensive metabolic panel; Future    Follow up in 1 year for annual physical or PRN

## 2024-07-16 ENCOUNTER — HOSPITAL ENCOUNTER (OUTPATIENT)
Dept: RADIOLOGY | Facility: HOSPITAL | Age: 39
Discharge: HOME | End: 2024-07-16
Payer: COMMERCIAL

## 2024-07-16 DIAGNOSIS — B18.1 CHRONIC HEPATITIS B (MULTI): ICD-10-CM

## 2024-07-16 PROCEDURE — 76705 ECHO EXAM OF ABDOMEN: CPT | Performed by: RADIOLOGY

## 2024-07-16 PROCEDURE — 76705 ECHO EXAM OF ABDOMEN: CPT

## 2024-07-29 ENCOUNTER — APPOINTMENT (OUTPATIENT)
Dept: ENDOCRINOLOGY | Facility: CLINIC | Age: 39
End: 2024-07-29
Payer: COMMERCIAL

## 2024-07-29 ENCOUNTER — APPOINTMENT (OUTPATIENT)
Dept: BEHAVIORAL HEALTH | Facility: CLINIC | Age: 39
End: 2024-07-29
Payer: COMMERCIAL

## 2024-07-29 DIAGNOSIS — F41.8 ANXIETY ABOUT HEALTH: Primary | ICD-10-CM

## 2024-07-29 PROCEDURE — 90791 PSYCH DIAGNOSTIC EVALUATION: CPT | Performed by: PSYCHOLOGIST

## 2024-07-29 NOTE — LETTER
"July 29, 2024     Sneha Schmitz, APRN-CNP  [address]  [facility] [address]    Patient: Aidee Zamorano   YOB: 1985   Date of Visit: 7/29/2024       Dear Dr. Sneha Schmitz, APRN-CNP:    Thank you for referring Aidee Zamorano to me for evaluation. Below are my notes for this consultation.  If you have questions, please do not hesitate to call me. I look forward to following your patient along with you.       Sincerely,     Blanca Amin, PhD      CC: Mary Faith, APRN-CNP  [facility] CLINICAL SUPPORT STAFF  ______________________________________________________________________________________    Psychosocial Consultation regarding Third Party Reproduction    Virtual visit with consent    Aidee is a 38 year old single woman requesting IVF using undisclosed donor sperm.  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  She is single and never pregnant. She would prefer to parent within a relationship but does not currently have a partner and is aware that her fertility is declining with age. Therefore she would like to undergo IVF using undisclosed donor sperm.  She may decide to wait 1 year for FET just in case she meets someone but also may choose to transfer an embryo sooner.  Aidee has concerns about the impact of using donor sperm and being raised by a single mother on the mental health of offspring. We discussed the impact of being single by choice vs other family constellations and I provided some resources on single mothers by choice and many books for children about single mothers and donor sperm.  She has not yet selected a sperm bank or donor but plans on choosing a CMV - donor that is well educated and over 5'7\".  She would prefer to know any history of mental illness if possible.  She denies any psychiatric history, substance abuse or history of physical or sexual abuse.  She will likely donate to research any additional embryos that are frozen should any remain once she " has completed her own family building.  Impression  It is my clinical opinion that Aidee Zamorano is able to give informed consent and has carefully considered the psychosocial issues inherent in this third party reproductive option.

## 2024-07-29 NOTE — PROGRESS NOTES
"Psychosocial Consultation regarding Third Party Reproduction    Virtual visit with consent    Aidee is a 38 year old single woman requesting IVF using undisclosed donor sperm.  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  She is single and never pregnant. She would prefer to parent within a relationship but does not currently have a partner and is aware that her fertility is declining with age. Therefore she would like to undergo IVF using undisclosed donor sperm.  She may decide to wait 1 year for FET just in case she meets someone but also may choose to transfer an embryo sooner.  Aidee has concerns about the impact of using donor sperm and being raised by a single mother on the mental health of offspring. We discussed the impact of being single by choice vs other family constellations and I provided some resources on single mothers by choice and many books for children about single mothers and donor sperm.  She has not yet selected a sperm bank or donor but plans on choosing a CMV - donor that is well educated and over 5'7\".  She would prefer to know any history of mental illness if possible.  She denies any psychiatric history, substance abuse or history of physical or sexual abuse.  She will likely donate to research any additional embryos that are frozen should any remain once she has completed her own family building.  Impression  It is my clinical opinion that Aidee Zamorano is able to give informed consent and has carefully considered the psychosocial issues inherent in this third party reproductive option.  "

## 2024-07-31 ENCOUNTER — OFFICE VISIT (OUTPATIENT)
Dept: ENDOCRINOLOGY | Facility: CLINIC | Age: 39
End: 2024-07-31
Payer: COMMERCIAL

## 2024-07-31 DIAGNOSIS — Q51.3 BICORNUATE UTERUS: ICD-10-CM

## 2024-07-31 DIAGNOSIS — Z01.812 ENCOUNTER FOR PREPROCEDURAL LABORATORY EXAMINATION: ICD-10-CM

## 2024-07-31 DIAGNOSIS — Q51.28 DIDELPHIC UTERUS: Primary | ICD-10-CM

## 2024-07-31 LAB — PREGNANCY TEST URINE, POC: NEGATIVE

## 2024-07-31 PROCEDURE — 76831 ECHO EXAM UTERUS: CPT | Performed by: NURSE PRACTITIONER

## 2024-07-31 PROCEDURE — 81025 URINE PREGNANCY TEST: CPT | Performed by: NURSE PRACTITIONER

## 2024-07-31 NOTE — PROGRESS NOTES
Patient ID: Aidee Zamorano is a 38 y.o. female.    Sonohysterogram    Date/Time: 7/31/2024 4:07 PM    Performed by: DARRIN Zepeda  Authorized by: DARRIN Zepeda    Consent:     Consent obtained:  Verbal and written    Consent given by:  Patient    Patient questions answered: yes      Patient agrees, verbalizes understanding, and wants to proceed: yes      Instructions and paperwork completed: yes    Pre-procedure:     Pre-procedure timeout performed: yes      Prepped with: Betadine    Procedure:     Cervix cleaned and prepped: yes      Catheter inserted: yes      Uterine cavity distended with saline: yes    Post-procedure:     No complications: no      Estimated blood loss (mL): minimal.    Post procedure instructions given to patient: yes      Patient tolerated procedure well with no complications: yes    Comments:      Prior to the procedure, the patient was counseled regarding risks, benefits, and alternatives.    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   Bubble Test performed: No  Additional Findings:   Follow up:  Follow up required, chart forwarded to primary MD.    Patient needs to make an MD follow up to discuss surgery. Also of note patient has a vaginal septum.   Will also need IVF consult.     Dr. Jackson in room to assist.     Fani Sandy 07/31/24 4:07 PM

## 2024-08-09 ENCOUNTER — LAB (OUTPATIENT)
Dept: LAB | Facility: LAB | Age: 39
End: 2024-08-09
Payer: COMMERCIAL

## 2024-08-09 DIAGNOSIS — Z00.00 HEALTH MAINTENANCE EXAMINATION: ICD-10-CM

## 2024-08-09 LAB
25(OH)D3 SERPL-MCNC: 46 NG/ML (ref 30–100)
ALBUMIN SERPL BCP-MCNC: 3.8 G/DL (ref 3.4–5)
ALP SERPL-CCNC: 45 U/L (ref 33–110)
ALT SERPL W P-5'-P-CCNC: 15 U/L (ref 7–45)
ANION GAP SERPL CALC-SCNC: 12 MMOL/L (ref 10–20)
AST SERPL W P-5'-P-CCNC: 14 U/L (ref 9–39)
BASOPHILS # BLD AUTO: 0.04 X10*3/UL (ref 0–0.1)
BASOPHILS NFR BLD AUTO: 0.6 %
BILIRUB SERPL-MCNC: 0.5 MG/DL (ref 0–1.2)
BUN SERPL-MCNC: 10 MG/DL (ref 6–23)
CALCIUM SERPL-MCNC: 8.8 MG/DL (ref 8.6–10.6)
CHLORIDE SERPL-SCNC: 106 MMOL/L (ref 98–107)
CHOLEST SERPL-MCNC: 181 MG/DL (ref 0–199)
CHOLESTEROL/HDL RATIO: 3.4
CO2 SERPL-SCNC: 24 MMOL/L (ref 21–32)
CREAT SERPL-MCNC: 0.65 MG/DL (ref 0.5–1.05)
EGFRCR SERPLBLD CKD-EPI 2021: >90 ML/MIN/1.73M*2
EOSINOPHIL # BLD AUTO: 0.24 X10*3/UL (ref 0–0.7)
EOSINOPHIL NFR BLD AUTO: 3.9 %
ERYTHROCYTE [DISTWIDTH] IN BLOOD BY AUTOMATED COUNT: 12.2 % (ref 11.5–14.5)
EST. AVERAGE GLUCOSE BLD GHB EST-MCNC: 105 MG/DL
GLUCOSE SERPL-MCNC: 93 MG/DL (ref 74–99)
HBA1C MFR BLD: 5.3 %
HCT VFR BLD AUTO: 40 % (ref 36–46)
HDLC SERPL-MCNC: 53.3 MG/DL
HGB BLD-MCNC: 13.1 G/DL (ref 12–16)
IMM GRANULOCYTES # BLD AUTO: 0.01 X10*3/UL (ref 0–0.7)
IMM GRANULOCYTES NFR BLD AUTO: 0.2 % (ref 0–0.9)
LDLC SERPL CALC-MCNC: 118 MG/DL
LYMPHOCYTES # BLD AUTO: 2.31 X10*3/UL (ref 1.2–4.8)
LYMPHOCYTES NFR BLD AUTO: 37.5 %
MCH RBC QN AUTO: 30.4 PG (ref 26–34)
MCHC RBC AUTO-ENTMCNC: 32.8 G/DL (ref 32–36)
MCV RBC AUTO: 93 FL (ref 80–100)
MONOCYTES # BLD AUTO: 0.36 X10*3/UL (ref 0.1–1)
MONOCYTES NFR BLD AUTO: 5.8 %
NEUTROPHILS # BLD AUTO: 3.2 X10*3/UL (ref 1.2–7.7)
NEUTROPHILS NFR BLD AUTO: 52 %
NON HDL CHOLESTEROL: 128 MG/DL (ref 0–149)
NRBC BLD-RTO: 0 /100 WBCS (ref 0–0)
PLATELET # BLD AUTO: 234 X10*3/UL (ref 150–450)
POTASSIUM SERPL-SCNC: 4 MMOL/L (ref 3.5–5.3)
PROT SERPL-MCNC: 7 G/DL (ref 6.4–8.2)
RBC # BLD AUTO: 4.31 X10*6/UL (ref 4–5.2)
SODIUM SERPL-SCNC: 138 MMOL/L (ref 136–145)
T4 FREE SERPL-MCNC: 1.08 NG/DL (ref 0.78–1.48)
TRIGL SERPL-MCNC: 48 MG/DL (ref 0–149)
TSH SERPL-ACNC: 4.43 MIU/L (ref 0.44–3.98)
VLDL: 10 MG/DL (ref 0–40)
WBC # BLD AUTO: 6.2 X10*3/UL (ref 4.4–11.3)

## 2024-08-09 PROCEDURE — 83036 HEMOGLOBIN GLYCOSYLATED A1C: CPT

## 2024-08-09 PROCEDURE — 84443 ASSAY THYROID STIM HORMONE: CPT

## 2024-08-09 PROCEDURE — 84439 ASSAY OF FREE THYROXINE: CPT

## 2024-08-09 PROCEDURE — 80061 LIPID PANEL: CPT

## 2024-08-09 PROCEDURE — 85025 COMPLETE CBC W/AUTO DIFF WBC: CPT

## 2024-08-09 PROCEDURE — 80053 COMPREHEN METABOLIC PANEL: CPT

## 2024-08-09 PROCEDURE — 82306 VITAMIN D 25 HYDROXY: CPT

## 2024-08-13 ENCOUNTER — TELEMEDICINE (OUTPATIENT)
Dept: ENDOCRINOLOGY | Facility: CLINIC | Age: 39
End: 2024-08-13
Payer: COMMERCIAL

## 2024-08-13 VITALS — BODY MASS INDEX: 22.68 KG/M2 | HEIGHT: 63 IN | WEIGHT: 128 LBS

## 2024-08-13 DIAGNOSIS — Z31.62 ENCOUNTER FOR FERTILITY PRESERVATION COUNSELING: Primary | ICD-10-CM

## 2024-08-13 PROCEDURE — 99214 OFFICE O/P EST MOD 30 MIN: CPT

## 2024-08-13 PROCEDURE — 3008F BODY MASS INDEX DOCD: CPT

## 2024-08-13 ASSESSMENT — PATIENT HEALTH QUESTIONNAIRE - PHQ9
1. LITTLE INTEREST OR PLEASURE IN DOING THINGS: NOT AT ALL
2. FEELING DOWN, DEPRESSED OR HOPELESS: NOT AT ALL
SUM OF ALL RESPONSES TO PHQ9 QUESTIONS 1 AND 2: 0

## 2024-08-13 ASSESSMENT — PAIN SCALES - GENERAL: PAINLEVEL: 0-NO PAIN

## 2024-08-13 NOTE — PROGRESS NOTES
Virtual or Telephone Consent: An interactive audio and video telecommunication system which permits real time communications between the patient (at the originating site) and provider (at the distant site) was utilized to provide this telehealth service    Follow Up Visit HPI    Patient is a 38 y.o.  female with presents to discuss egg preservation or possible IVF with donor Sperm,  h/o elevated TSH level  at 4.26 and repeat testing was negative in  & Chronic Hepatitis B- had an appt 2024 with Dr. Emanuel in Hepatology, presenting today for follow up visit to discuss test results and  treatment options.     Recently ^ TSH on 2024= 4.43- will reach back to PCP for treatment      H/o bicornuate uterus- diagnosed in [place] 10 yrs ago     PRIOR EVALUATION / TREATMENT: no      Prior Labs  Lab Results    Date Done      AMH: 2.333 (Ref range: 0.176 - 11.705 ng/mL) 2024   TSH: 4.43 (H; Ref range: 0.44 - 3.98 mIU/L)- will reach back to PCP for treatment  2024   PRL: No results found for requested labs within last 1825 days. No results found for requested labs within last 1825 days.   Testosterone: No results found for requested labs within last 1825 days. No results found for requested labs within last 1825 days.   DHEAS: No results found for requested labs within last 1825 days. No results found for requested labs within last 1825 days.   FSH: 12.9 (Ref range: IU/L) 2023   17 OHP: No results found for requested labs within last 1825 days. No results found for requested labs within last 1825 days.   HgbA1c: 5.3 (Ref range: see below %) 2024   CMV IgG: reactive  2024   CMV IgM: negative 2024   Hepatitis B surface antigen: Reactive (A; Ref range: Nonreactive) 2024   Hepatitis C antibody: Nonreactive (Ref range: Nonreactive) 2024   HIV ½ Antigen Antibody screen with reflex: Nonreactive (Ref range: Nonreactive) 2024   Syphilis screening with reflex: No results  found for requested labs within last 1825 days. 2024   GC: Negative (Ref range: Negative) 2024   CT: Negative (Ref range: Negative) 2024   Type and Screen: A 2024   Rh: POS 2024   Antibody: NEG (Ref range: ) 2024   Rubella: Positive (Ref range: Negative) 2024   Varicella: Positive (A; Ref range: Negative) 2024   Hemoglobin: No results found for requested labs within last 1825 days. No results found for requested labs within last 1825 days.   Hematocrit: No results found for requested labs within last 1825 days. No results found for requested labs within last 1825 days.   Creatinine: 0.65 (Ref range: 0.50 - 1.05 mg/dL) 2024   AST:14 (Ref range: 9 - 39 U/L) 2024   ALT:15 (Ref range: 7 - 45 U/L): 2024     Myriad: negative    Psych eval: 2024: cleared    Saw  Dr. Abdifatah Emanuel 2024: for Hep B- is cleared, needs FibroScan & there was discussion about the safety of hepatitis B medications.  no massive data on the safety of hepatitis B medications in pregnancy.  However, from what we know, there should be little risk associated with the hepatitis B medications during pregnancy.  Also, given the fact that she had a significant flare in , I would not feel comfortable stopping her hepatitis B medication.  After our lengthy discussion, she has opted to stay on her Entecavir.       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 Best visualized on 3D views. Possible polyp right uterine horn upon  instillation of saline. Vaginal septum on exam. Transabdominal images were acquired in addition to transvaginal images for optimal visualization of pelvic structures.  GYN Pelvic Ultrasound: with [facility]    Relationship Status:  single     OB Hx:      OB History            0    Para   0    Term   0        0    AB   0    Living   0           SAB   0    IAB   0    Ectopic   0    Multiple   0    Live Births   0                     GYN HISTORY   Have you ever been diagnosed with a sexually transmitted disease? Have you ever been diagnosed with a sexually transmitted disease?: No  Hepatitis B  Have you ever had Pelvic Inflammatory Disease? Have you ever had Pelvic Inflammatory Disease?: No  Have you had an abnormal PAP smear? Have you had an abnormal PAP smear?: No  Date & Result of last PAP smear:          Lab Results   Component Value Date     FINALINTERP   03/01/2024             A. THINPREP PAP CERVIX, SCREENING -      Specimen Adequacy  Satisfactory for evaluation; endocervical/transformation zone component is present  Quality Indicator: Partially obscured by cytolysis     General Categorization  Negative for intraepithelial lesion or malignancy.     Descriptive Interpretation  Negative for intraepithelial lesion or malignancy                  Have you ever had an abnormal Mammogram? Have you ever had an abnormal mammogram?: No  Date & result of your last mammogram:  no  Do you have pelvic pain? Do you have pelvic pain?: No  How many times per week do you have intercourse?  NA  Do you have pain with intercourse? Do you have pain with intercourse?: No  Do you use lubricants with intercourse?  NA  Do you have pain with bowel movements? Do you have pain with bowel movements?: No  Do you have pain with a full bladder? Do you have pain with a full bladder?: No     MENSTRUAL HISTORY  LMP: [id number]  Cycle length: What is the average number of days between menstrual cycles?: 26  Describe your bleeding: Describe your bleeding:: Average  Dysmenorrhea: Are your menstrual periods painful?: Yes        ENDOCRINE/INFERTILITY HISTORY  Duration of infertility: If applicable, what is the approximate date you began trying to get pregnant?: Not applicable  Coital Activity/week:  NA  Nipple Discharge: Do you experience any loss of  milk or liquid discharge from the breasts?: No  Vision changes: Are you experiencing any vision changes?: No  Headaches: Are you experiencing headaches?: No  Excess hair growth: Are you experiencing persistent or worsening hair growth on the face, breasts or lower abdomen?: No  Excessive hair loss: Are you experiencing loss of hair from your scalp?: No  Acne: Are you experiencing acne?: Yes  Oily skin: Oily skin?: Yes  Recent weight change  Weight gain: Are you experiencing any increase in weight?: No  Weight loss: Are you experiencing any decrease in weight?: No  Exercise more than 3 times a week: Exercise more than 3 times a week?: Yes       Past Medical History:   Diagnosis Date    Bicornate uterus 2017    Bicornate uterus    Chronic viral hepatitis B without delta-agent (Multi) 2022    Chronic hepatitis B    Other specified abnormal findings of blood chemistry 05/15/2019    Increased prolactin level     No past surgical history on file.  Current Outpatient Medications on File Prior to Visit   Medication Sig Dispense Refill    benzoyl peroxide 5 % external wash Apply 1 Application topically once daily. In the morning 227 g 11    clindamycin (Clindagel) 1 % gel Apply topically 2 times a day. to affected area      entecavir (Baraclude) 0.5 mg tablet Take 1 tablet (0.5 mg) by mouth once daily. 90 tablet 3    tretinoin (Retin-A) 0.05 % cream Apply 1 Application topically once daily at bedtime. Start 1-2 nights per week 1-2 weeks, inc to every other night, then every night as tolerated 45 g 11     No current facility-administered medications on file prior to visit.       BMI:   BMI Readings from Last 1 Encounters:   07/15/24 23.38 kg/m²     VITALS:  There were no vitals taken for this visit.  LMP: 2024    ASSESSMENT   38 y.o.  female with desire for egg preservation or possible IVF with donor Sperm, recent TSH 2024 elevated at 4.43, will reach back to PCP who ordered testing to start on  "Synthroid,  h/o Chronic Hepatitis B- had an appt 6- with Dr. Emanuel hepatology:      needs Hep B DNA every 6 months, hepatic function labs, AFP, & liver ultrasound every 6 months, seen annually,     She is currently exploring in vitro fertilization.  We spoke at length about the risk of spread to the unborn child.  In fact, with her hepatitis B DNA being essentially negative, the risk should be very small.  Either way, the OB and/or neonatologist can give the baby hepatitis B immunoglobulin and vaccines at the time of birth.     We did talk about the safety of hepatitis B medications.  We do not have massive data on the safety of hepatitis B medications in pregnancy.  However, from what we know, there should be little risk associated with the hepatitis B medications during pregnancy.  Also, given the fact that she had a significant flare in 2010, I would not feel comfortable stopping her hepatitis B medication.  After our lengthy discussion, she has opted to stay on her Entecavir.     We have never done a FibroScan on her.  I will recommend that we get one as a baseline study.  on Entecavir.    COUNSELING  We discussed AMH testing and the patient's AMH level, if applicable.  AMH is considered favorable if >1 however this test has many limitations including poor predictive rates of pregnancy. In randomized control trials such as \"Time to conceive\" pts with low AMH levels (<0.7) has similar cumulative pregnancy rates compared with women that had normal AMH levels.  In the \"AMIGOS\" study, AMH did not predict pregnancy rates following OS/IUI for unexplained infertility. However,  AMH is a marker that is helpful to predict how a patient may respond or oocyte yields with FSH and ART treatments, but again there is conflicting data about how this affects pregnancy rates with ART.    We discussed options for fertility preservation including oocyte cryopreservation and embryo cryopreservation, and discussed advances in " cryopreservation specifically the optimization of vitrification to enhance oocyte freezing and enhance the likelihood of pregnancies after thaw.  Discussed with patient the clinical data that currently exists about efficacy of oocyte cryopreservation as a strategy for fertility preservation and that this is an evolving area. At present several concepts have been demonstrated. Increased maternal age likely impacts total egg yield and likelihood of pregnancy after thaw. Offspring outcomes appear to be comparable for children conceived after IVF and oocyte freezing/thaw/IVF but research in this area is evolving and much more outcomes data exists for children conceived after embryo cryo than oocyte cryo. An upper limit on duration of time that oocytes can be frozen and still result in a pregnancy has not been defined. There is no guarantee for a pregnancy with any amount of oocytes cryopreserved. Reviewed nature of stimulation, injectable hormones used, and monitoring process and the process of oocyte retrieval as an outpatient surgical procedure to harvest oocytes. Risks of this procedure include ovarian hyperstimulation syndrome, anesthesia risks during egg retrieval. Reviewed need to consult with financial specialists in our office to delineate coverage and costs.    Routine Testing [facility]  STDs Within 1 year   Genetic carrier Waiver/Completed   T&S Within 1 year   AMH Within 1 year   TSH Within 1 year   Rubella/Varicella Within 5 years     BMI Testing [facility]  CBC Within 1 year   CMP Within 1 year   HgbA1c Within 1 year   Mag, Phos, Vit D <18 Within 1 year   MFM > 40  REQ   Wt loss consult > 40 OPT     PLAN  Saw  Dr. Abdifatah Emanuel 6-: for Hep B- he ordered a Fibroscan to be done & need to wait for AFP & Hep Function tests to result  Schedule FUV to discuss surgery with SIS result: polyp, septum, didelphys uterus- schedule an appt   Schedule IVF Consult for egg preservation vs IVF with  Donor Sperm- schedule an appt  Schedule MFM for Chronic Hep B, AMA, & Bicornuate Uterus if decides to conceive   Engaged MD  Take prenatal vitamins, vitamin D 2000 IUs daily  Discussed that treatment cannot proceed until checklist items are complete.   Additional testing for BMI < 18 or > 40: NA.  Chart to primary nurse for care coordination and patient check list/education.    MD Completion:  Ectopic Risk: No  Medically Complex: Yes- chronic Hep B & possible bicornuate uterus (diagnosed in [place] 10 yrs ago, no documentation)     Outstanding boarding pass items: Hepatitis testing, schedule surgery consult and IVF Consult: egg preservation vs IVF with donor sperm    Sneha Schmitz, MATIAS 08/14/24 11:06 PM

## 2024-09-17 ENCOUNTER — OFFICE VISIT (OUTPATIENT)
Dept: ENDOCRINOLOGY | Facility: CLINIC | Age: 39
End: 2024-09-17
Payer: COMMERCIAL

## 2024-09-17 VITALS
HEART RATE: 70 BPM | HEIGHT: 63 IN | DIASTOLIC BLOOD PRESSURE: 56 MMHG | SYSTOLIC BLOOD PRESSURE: 102 MMHG | BODY MASS INDEX: 23.11 KG/M2 | RESPIRATION RATE: 16 BRPM | WEIGHT: 130.4 LBS

## 2024-09-17 DIAGNOSIS — E03.9 HYPOTHYROIDISM, UNSPECIFIED TYPE: Primary | ICD-10-CM

## 2024-09-17 PROCEDURE — 99213 OFFICE O/P EST LOW 20 MIN: CPT | Performed by: INTERNAL MEDICINE

## 2024-09-17 PROCEDURE — 1036F TOBACCO NON-USER: CPT | Performed by: INTERNAL MEDICINE

## 2024-09-17 PROCEDURE — 3008F BODY MASS INDEX DOCD: CPT | Performed by: INTERNAL MEDICINE

## 2024-09-17 RX ORDER — LEVOTHYROXINE SODIUM 50 UG/1
50 TABLET ORAL DAILY
Qty: 90 TABLET | Refills: 1 | Status: SHIPPED | OUTPATIENT
Start: 2024-09-17 | End: 2025-09-17

## 2024-09-17 ASSESSMENT — ENCOUNTER SYMPTOMS
HEADACHES: 0
CHILLS: 0
VOMITING: 0
FATIGUE: 0
PALPITATIONS: 0
SHORTNESS OF BREATH: 0
DIARRHEA: 0
FEVER: 0
COUGH: 0
NAUSEA: 0

## 2024-09-17 NOTE — PROGRESS NOTES
Endocrinology: Follow up visit  Subjective   Patient ID: Aidee Zamorano is a 39 y.o. female who presents for Hypothyroidism and Thyroid Problem.    PCP: DARRIN Mckee    HPI  Here for follow up hypothyroidism.  Last seen almost a year ago.  At that time tsh improved and was normal so held off on medication.    She is now planning to start ivf soon and repeat tsh 4.4.  Advised by julita to start treatment.   She is here to discuss.  She is still very reluctant to start t4 but will if needed.  Feels fine, no complaints    Review of Systems   Constitutional:  Negative for chills, fatigue and fever.   Respiratory:  Negative for cough and shortness of breath.    Cardiovascular:  Negative for chest pain and palpitations.   Gastrointestinal:  Negative for diarrhea, nausea and vomiting.   Neurological:  Negative for headaches.       Patient Active Problem List   Diagnosis    Chronic hepatitis B (Multi)    Allergic contact dermatitis due to other agents        Home Meds:  Current Outpatient Medications   Medication Instructions    entecavir (BARACLUDE) 0.5 mg, oral, Daily        No Known Allergies     Objective   Vitals:    09/17/24 0804   BP: 102/56   Pulse: 70   Resp: 16      Vitals:    09/17/24 0804   Weight: 59.1 kg (130 lb 6.4 oz)      Body mass index is 23.1 kg/m².   Physical Exam  Constitutional:       Appearance: Normal appearance. She is normal weight.   HENT:      Head: Normocephalic and atraumatic.   Neck:      Thyroid: No thyroid mass, thyromegaly or thyroid tenderness.   Cardiovascular:      Rate and Rhythm: Normal rate and regular rhythm.      Heart sounds: No murmur heard.     No gallop.   Pulmonary:      Effort: Pulmonary effort is normal.      Breath sounds: Normal breath sounds.   Abdominal:      Palpations: Abdomen is soft.      Comments: benign   Neurological:      General: No focal deficit present.      Mental Status: She is alert and oriented to person, place, and time.      Deep Tendon Reflexes:  Reflexes are normal and symmetric.   Psychiatric:         Behavior: Behavior is cooperative.         Labs:  Lab Results   Component Value Date    HGBA1C 5.2 08/14/2024    TSH 4.43 (H) 08/09/2024    FREET4 1.08 08/09/2024      Lab Results   Component Value Date    THYROIDPAB <28 11/30/2023        Assessment/Plan   Assessment & Plan  Hypothyroidism, unspecified type    Discussed course and importance of normal thyroid levels prior to pregnancy.  We discussed that the fetus has no thyroid gland in the early parts of development and her thyroid levels should be well controlled to avoid possible developmental issues.  Also discussed pregnancy puts extra demands on the thyroid and can decrease levels further.   I would recommend starting t4,  she reluctantly agrees.   Will start levothyroxine 50 mcg and recheck in one month.  Advised her to let me know if she becomes pregnant as we will do monthly labs and adjusts as needed.  After pregnancy can discuss coming off it depending on requirements throughout pregnancy.  Follow up in 6 months      Electronically signed by:  Lyudmila Vargas MD 09/17/24 8:29 AM

## 2024-09-17 NOTE — PATIENT INSTRUCTIONS
Start thyroid medication 50 mcg daily in am on empty stomach  Recheck labs in one month  Please notify my office if you become pregnant  Follow up in 6 months

## 2024-12-05 ENCOUNTER — APPOINTMENT (OUTPATIENT)
Dept: ENDOCRINOLOGY | Facility: CLINIC | Age: 39
End: 2024-12-05
Payer: COMMERCIAL

## 2024-12-10 DIAGNOSIS — B18.1 CHRONIC VIRAL HEPATITIS B WITHOUT DELTA AGENT AND WITHOUT COMA (MULTI): ICD-10-CM

## 2024-12-10 RX ORDER — ENTECAVIR 0.5 MG/1
TABLET, FILM COATED ORAL
Qty: 90 TABLET | Refills: 3 | Status: SHIPPED | OUTPATIENT
Start: 2024-12-10

## 2025-07-12 ENCOUNTER — APPOINTMENT (OUTPATIENT)
Dept: RADIOLOGY | Facility: HOSPITAL | Age: 40
End: 2025-07-12
Payer: COMMERCIAL

## 2025-07-12 DIAGNOSIS — B18.1 CHRONIC HEPATITIS B (MULTI): ICD-10-CM

## 2025-07-12 PROCEDURE — 76705 ECHO EXAM OF ABDOMEN: CPT

## 2025-07-12 PROCEDURE — 76705 ECHO EXAM OF ABDOMEN: CPT | Performed by: STUDENT IN AN ORGANIZED HEALTH CARE EDUCATION/TRAINING PROGRAM

## 2025-07-15 ENCOUNTER — PATIENT MESSAGE (OUTPATIENT)
Dept: GASTROENTEROLOGY | Facility: CLINIC | Age: 40
End: 2025-07-15
Payer: COMMERCIAL

## 2025-07-26 ENCOUNTER — OFFICE VISIT (OUTPATIENT)
Dept: URGENT CARE | Age: 40
End: 2025-07-26
Payer: COMMERCIAL

## 2025-07-26 VITALS
HEART RATE: 87 BPM | TEMPERATURE: 98.4 F | RESPIRATION RATE: 18 BRPM | OXYGEN SATURATION: 96 % | SYSTOLIC BLOOD PRESSURE: 96 MMHG | DIASTOLIC BLOOD PRESSURE: 63 MMHG

## 2025-07-26 DIAGNOSIS — S40.022A CONTUSION OF LEFT UPPER EXTREMITY, INITIAL ENCOUNTER: ICD-10-CM

## 2025-07-26 DIAGNOSIS — S39.012A STRAIN OF FASCIA OF LOWER BACK: ICD-10-CM

## 2025-07-26 DIAGNOSIS — F43.10 PTSD (POST-TRAUMATIC STRESS DISORDER): Primary | ICD-10-CM

## 2025-07-26 DIAGNOSIS — B18.1 CHRONIC HEPATITIS B (MULTI): ICD-10-CM

## 2025-07-26 DIAGNOSIS — S80.12XA CONTUSION OF LEFT LOWER EXTREMITY, INITIAL ENCOUNTER: ICD-10-CM
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
## 2025-07-28 ENCOUNTER — ANCILLARY PROCEDURE (OUTPATIENT)
Dept: URGENT CARE | Age: 40
End: 2025-07-28
Payer: COMMERCIAL

## 2025-07-28 ENCOUNTER — OFFICE VISIT (OUTPATIENT)
Dept: URGENT CARE | Age: 40
End: 2025-07-28
Payer: COMMERCIAL

## 2025-07-28 VITALS
OXYGEN SATURATION: 98 % | DIASTOLIC BLOOD PRESSURE: 67 MMHG | SYSTOLIC BLOOD PRESSURE: 100 MMHG | HEART RATE: 82 BPM | TEMPERATURE: 98.2 F | RESPIRATION RATE: 16 BRPM | BODY MASS INDEX: 23.03 KG/M2 | WEIGHT: 130 LBS

## 2025-07-28 DIAGNOSIS — M54.50 LUMBAR PAIN: Primary | ICD-10-CM

## 2025-07-28 DIAGNOSIS — M62.830 MUSCLE SPASM OF BACK: ICD-10-CM

## 2025-07-28 DIAGNOSIS — M54.50 LUMBAR PAIN: ICD-10-CM

## 2025-07-28 PROCEDURE — 72100 X-RAY EXAM L-S SPINE 2/3 VWS: CPT

## 2025-07-28 PROCEDURE — 1036F TOBACCO NON-USER: CPT

## 2025-07-28 PROCEDURE — 99214 OFFICE O/P EST MOD 30 MIN: CPT

## 2025-07-28 RX ORDER — CYCLOBENZAPRINE HCL 10 MG
10 TABLET ORAL NIGHTLY PRN
Qty: 7 TABLET | Refills: 0 | Status: SHIPPED | OUTPATIENT
Start: 2025-07-28 | End: 2025-08-04

## 2025-07-28 RX ORDER — IBUPROFEN 600 MG/1
600 TABLET, FILM COATED ORAL EVERY 6 HOURS PRN
Qty: 20 TABLET | Refills: 0 | Status: SHIPPED | OUTPATIENT
Start: 2025-07-28 | End: 2025-08-02

## 2025-07-28 ASSESSMENT — ENCOUNTER SYMPTOMS: BACK PAIN: 1

## 2025-07-28 ASSESSMENT — PAIN SCALES - GENERAL: PAINLEVEL_OUTOF10: 8

## 2025-07-28 NOTE — LETTER
July 28, 2025     Patient: Aidee Zamorano   YOB: 1985   Date of Visit: 7/28/2025       To Whom It May Concern:    Aidee Zamorano was seen in my clinic on 7/28/2025 at 9:35 am. Please excuse Aidee for her absence from work on this day to make the appointment. She may return to work on 07/31/2025.    If you have any questions or concerns, please don't hesitate to call.         Sincerely,         ZEHRA Salinas-CNP        CC: No Recipients

## 2025-07-28 NOTE — PATIENT INSTRUCTIONS
You were seen at Urgent Care today for low back pain. Please treat as discussed. Please take medications as prescribed.  Do not take muscle relaxer while driving or participating active that requires your full attention as may make you drowsy . Monitor for red flags which we spoke about, If your symptoms change, worsen or become concerning in any way, please go to the emergency room immediately, otherwise you can followup with your PCP in 2-3 days as needed
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
## 2025-08-03 ENCOUNTER — OFFICE VISIT (OUTPATIENT)
Dept: URGENT CARE | Age: 40
End: 2025-08-03
Payer: COMMERCIAL

## 2025-08-03 VITALS
HEART RATE: 76 BPM | SYSTOLIC BLOOD PRESSURE: 99 MMHG | TEMPERATURE: 98.2 F | DIASTOLIC BLOOD PRESSURE: 63 MMHG | OXYGEN SATURATION: 99 % | RESPIRATION RATE: 20 BRPM

## 2025-08-03 DIAGNOSIS — H93.12 TINNITUS OF LEFT EAR: Primary | ICD-10-CM

## 2025-08-03 PROCEDURE — 99213 OFFICE O/P EST LOW 20 MIN: CPT

## 2025-08-03 PROCEDURE — 1036F TOBACCO NON-USER: CPT

## 2025-08-03 ASSESSMENT — ENCOUNTER SYMPTOMS
CONSTITUTIONAL NEGATIVE: 1
OCCASIONAL FEELINGS OF UNSTEADINESS: 0
LOSS OF SENSATION IN FEET: 0
NEUROLOGICAL NEGATIVE: 1
DEPRESSION: 0

## 2025-08-03 NOTE — PROGRESS NOTES
Subjective   Patient ID: Aidee Zamorano is a 39 y.o. female. They present today with a chief complaint of Earache (Pt presents with left ear ringing in her ear said it started last night and is 70% today. ).    History of Present Illness  39-year-old female presents clinic today with complaints of tinnitus in the left ear.  Patient states this happened minutes after being on the phone.  She states it has improved about 70%.  She denies any headache.  Denies blurry double vision.  Denies pain in the ear.  Denies nausea or vomiting.  Denies dizziness.  She has had this happen in the past.          Past Medical History  Allergies as of 08/03/2025    (No Known Allergies)       Prescriptions Prior to Admission[1]     Medical History[2]    Surgical History[3]     reports that she has never smoked. She has never been exposed to tobacco smoke. She has never used smokeless tobacco. She reports that she does not currently use alcohol. She reports that she does not use drugs.    Review of Systems  Review of Systems   Constitutional: Negative.    HENT:  Negative for ear pain.    Neurological: Negative.                                   Objective    Vitals:    08/03/25 1201   BP: 99/63   BP Location: Left arm   Patient Position: Sitting   Pulse: 76   Resp: 20   Temp: 36.8 °C (98.2 °F)   SpO2: 99%     Patient's last menstrual period was 07/09/2025 (approximate).    Physical Exam  Constitutional:       Appearance: Normal appearance.   HENT:      Right Ear: Tympanic membrane and ear canal normal.      Left Ear: Tympanic membrane and ear canal normal.     Neurological:      Mental Status: She is alert.         Procedures    Point of Care Test & Imaging Results from this visit  No results found for this visit on 08/03/25.   Imaging  No results found.    Cardiology, Vascular, and Other Imaging  No other imaging results found for the past 2 days      Diagnostic study results (if any) were reviewed by Marcelo Aponte PA-C.    Assessment/Plan    Allergies, medications, history, and pertinent labs/EKGs/Imaging reviewed by Marcelo Aponte PA-C.     Medical Decision Making  Patient pleasant cooperative on examination.    On examination there is no acute abnormality noted to the TMs or canals.    I discussed that tinnitus can be caused by many things such as eustachian tube dysfunction, infection, neurologic, other.    I discussed with patient with an improvement in less than a day it should continue to improve.    Advised to monitor for worsening signs.    Symptoms persist please follow-up with primary care or ENT for further evaluation.    Patient agreeable plan.  Patient alert and oriented, no acute stress upon discharge.    Orders and Diagnoses  Diagnoses and all orders for this visit:  Tinnitus of left ear      Medical Admin Record      Patient disposition: Home    Electronically signed by Marcelo Aponte PA-C  12:30 PM           [1] (Not in a hospital admission)   [2]   Past Medical History:  Diagnosis Date    Bicornate uterus 05/02/2017    Bicornate uterus    Chronic viral hepatitis B without delta-agent (Multi) 12/13/2022    Chronic hepatitis B    Other specified abnormal findings of blood chemistry 05/15/2019    Increased prolactin level   [3] No past surgical history on file.

## 2025-08-04 ENCOUNTER — APPOINTMENT (OUTPATIENT)
Dept: PRIMARY CARE | Facility: CLINIC | Age: 40
End: 2025-08-04
Payer: COMMERCIAL

## 2025-08-05 ENCOUNTER — OFFICE VISIT (OUTPATIENT)
Dept: GASTROENTEROLOGY | Facility: CLINIC | Age: 40
End: 2025-08-05
Payer: COMMERCIAL

## 2025-08-05 VITALS
SYSTOLIC BLOOD PRESSURE: 101 MMHG | OXYGEN SATURATION: 98 % | BODY MASS INDEX: 23.64 KG/M2 | HEIGHT: 63 IN | WEIGHT: 133.4 LBS | HEART RATE: 73 BPM | DIASTOLIC BLOOD PRESSURE: 67 MMHG

## 2025-08-05 DIAGNOSIS — B18.1 CHRONIC HEPATITIS B (MULTI): ICD-10-CM

## 2025-08-05 PROCEDURE — 99214 OFFICE O/P EST MOD 30 MIN: CPT | Performed by: INTERNAL MEDICINE

## 2025-08-05 PROCEDURE — 99212 OFFICE O/P EST SF 10 MIN: CPT

## 2025-08-05 PROCEDURE — 1036F TOBACCO NON-USER: CPT | Performed by: INTERNAL MEDICINE

## 2025-08-05 PROCEDURE — 3008F BODY MASS INDEX DOCD: CPT | Performed by: INTERNAL MEDICINE

## 2025-08-05 ASSESSMENT — ENCOUNTER SYMPTOMS
OCCASIONAL FEELINGS OF UNSTEADINESS: 0
LOSS OF SENSATION IN FEET: 0
DEPRESSION: 0

## 2025-08-05 ASSESSMENT — PAIN SCALES - GENERAL: PAINLEVEL_OUTOF10: 5

## 2025-08-05 NOTE — PROGRESS NOTES
HEPATOLOGY RETURN PATIENT VISIT    August 5, 2025      Dr. Terese Fiore    Patient Name:  ISABEL ZAMORANO  Medical Record Number: [id number]  YOB: 1985    Dear Dr. Fiore,    I had the pleasure of seeing Isabel Zamorano for follow-up evaluation in the [facility] ([facility] office). History and physical examination was performed. Pertinent available laboratory, imaging, pathology results were reviewed.     History of Present Illness:   The patient is a 39 year old Chinese female who is referred for follow-up evaluation of hepatitis B.    The patient is from [place].  She was diagnosed with hepatitis B at age 4.  She reports that she was always hepatitis B e Ag negative. She reports that she was asymptomatic and required no therapy from age 4 until 2010.  In 2010, she was undergoing routine labs and was told that her ALT was approximately 600 and her hepatitis B DNA was greater than 10,000,000 IU/ml.  At that time she was placed on entecavir 0.5 mg daily.  She has remained on that medicine since September 2010.    She had been getting labs in [place] every 3 months.  She had also been getting intermittent ultrasounds.  She had never had a liver biopsy.    She has never had any manifestation of liver disease including no ascites, hepatic encephalopathy, variceal bleeding, or jaundice.     Her father has hepatitis B.  Her mother and her siblings are all negative for hepatitis B.  Her  was reportedly negative for hepatitis B. He has received his HBV vaccines and they have no children.    She moved to the [place] in 2015 to pursue an DADA degree at [employer].  She initially saw me in December 2015 to get refills of her entecavir.  At that time I did additional lab tests and an ultrasound.  We did refill her entecavir.    She had had some early satiety and dyspepsia over the last year.  She was seen by Veronica Waldron NP for this.  The symptoms all resolved.    She saw me in clinic  9/2/2020.  At that time, I told her to get labs and an ultrasound every 6 months and see me back in 1 year.  Instead, she did not return for follow-up > 2 years later.  She saw me in clinic 12/13/2022.  I again told her to get labs and an ultrasound every 6 months and see me back in 1 year.  She did not return until 18 months later.    She then saw me back in clinic on 6/20/2024.  I again told her to get labs every 6 months, ultrasound every 6 months and repeat a FibroScan test.  She did not get any about lab testing done.  She also only got an ultrasound every 12 months and never got the FibroScan test.    She is being seen by reproductive endocrinology and infertility for possible in vitro fertilization.      She presented today for follow-up evaluation.  She denied any specific liver-related complaints.  She remains on entecavir.  She reports that she is compliant with the medication.    Past Medical/Surgical History:   HBV.    Family History:   Dad with HBV.    Social History:   She denied alcohol, tobacco, intravenous drug use, blood transfusions, or tattoos.  She completed her DADA at [employer] and now works in [employer] in [place]. She is . Her ex- had been vaccinated against hepatitis B. She is not currently in a sexual relationship.    Review of systems: As noted above.  She has had no nausea, vomiting, abdominal pain, or gastrointestinal bleeding.  No fever, chills, visual changes, auditory changes, shortness of breath, chest pain, GI bleeding, diarrhea, constipation, dysuria, depression, hematuria, musculoskeletal issues or rash.    Medical, Surgical, Family, and Social Histories  Medical History[1]    Surgical History[2]    Family History[3]    Social History     Socioeconomic History    Marital status: Single     Spouse name: Not on file    Number of children: Not on file    Years of education: Not on file    Highest education level: Not on file   Occupational History    Not on file   Tobacco Use  "   Smoking status: Never     Passive exposure: Never    Smokeless tobacco: Never   Vaping Use    Vaping status: Never Used   Substance and Sexual Activity    Alcohol use: Not Currently    Drug use: Never    Sexual activity: Not on file   Other Topics Concern    Not on file   Social History Narrative    Not on file     Social Drivers of Health     Financial Resource Strain: Not on file   Food Insecurity: Not on file   Transportation Needs: Not on file   Physical Activity: Not on file   Stress: Not on file   Social Connections: Not on file   Intimate Partner Violence: Not on file   Housing Stability: Not on file       Allergies and Current Medications  Allergies[4]  Current Medications[5]     Physical Exam  /67 (BP Location: Left arm, Patient Position: Sitting, BP Cuff Size: Adult)   Pulse 73   Ht 1.6 m (5' 3\")   Wt 60.5 kg (133 lb 6.4 oz)   LMP 07/09/2025 (Approximate)   SpO2 98%   BMI 23.63 kg/m²       Physical Examination:   General Appearance: alert and in no acute distress.   HEENT: oropharynx without lesions. Anicteric sclerae.   Neck supple, nontender, without adenopathy, thyromegaly, or JVD.   Lungs clear to auscultation and percussion.   Heart RRR without murmurs, rubs, or gallops.   Abdomen: Soft, nontender, bowel sounds positive, without obvious ascites. Liver and spleen not palpable.   Extremities full ROM, no atrophy, normal strength. No edema.   Skin reveals no lesions.  Neurological exam nonfocal, alert and oriented.  No asterixis.  No spider angiomata, or palmar erythema.     Labs 8/14/2024 hemoglobin A1c 5.2%, hepatitis B core antibody positive.    Labs 8/9/2024 glucose 93, sodium 138, creatinine 0.65, protein 7, albumin 3.8, alk phos 45, bilirubin 0.5, AST 14, ALT 15, WBC 6.2, hemoglobin 13.1, platelets 234.    Labs 7/3/2024 hepatitis B DNA undetectable.    Labs 6/17/2024 syphilis negative, HIV negative, hepatitis C antibody negative.    Labs 3/1/2024 syphilis negative, HIV " negative.    Labs 11/30/2023 TSH 3.04.    Labs 7/25/2023 WBC 5.6, hemoglobin 13.1, platelets 231, glucose 90, sodium 140, creatinine 0.77.    Labs 4/21/2023 AFP < 4, hepatitis B surface antigen positive, hepatitis delta antibody negative, protein 7, albumin 4, alk phos 45, bilirubin 0.9, AST 13, ALT 16.    Labs 8/12/2022 glucose 87, sodium 137, creatinine 0.75, cholesterol 193, , triglycerides 46, WBC 7.1, hemoglobin 13, platelets 231, hepatitis B DNA undetectable.    Labs 9/21/2021 hepatitis B DNA undetectable, protein 7.5, albumin 4.4, alk phos 50, bilirubin 0.6, AST 17, ALT 21.    Labs 8/3/2020 alpha-1 antitrypsin 130, protein 7.4, albumin 4.2, alkaline phosphatase 47, bilirubin 1.2, AST 12, ALT 10, hepatitis B surface antigen positive, hepatitis B surface antibody negative, hepatitis B DNA undetectable.    Labs 8/30/2019 HBV-DNA detected but < 20 IU/ml, AST 13, ALT 14.    Labs 12/20/2018 creatinine 0.7, glucose 96, protein 7.4, albumin 4.4, alkaline phosphatase 61, bilirubin 0.7, AST 12, ALT 14, INR 1.1, WBC 6.2, hemoglobin 14.1, platelets 281.    Labs 10/26/2018 hepatitis B DNA undetectable, AFP < 1, AST 13, ALT 15.    H. pylori breath test 10/26/2018 negative.    Labs 3/30/2018 hepatitis B DNA undetectable.    Labs 11/14/2017 HBV-DNA undetectable, cholesterol 192, triglycerides 63, glucose 92, creatinine 0.69, protein 6.9, albumin 4, alkaline phosphatase 51, bilirubin 0.8, AST 12, ALT 12.    Labs 5/3/2017 HBV-DNA detected but below the level of quantification.    Labs 9/9/2016 AFP < 1.    Labs 12/15/2015 HBV-DNA + but below the level of quantification, HAV +, AFP 2, HCV -, HBsAb -, HBsAg +, glucose 89, creatinine 0.63, protein 8, albumin 4.1, alkaline phosphatase 76, bilirubin 0.8, AST 16, ALT 32.    Labs 8/28/2015 HBV-DNA -, AST 25, ALT 34, alk phos 76, bilirubin normal, protein 7.7, albumin 4.21.    Labs 2010 revealed hepatitis B surface antigen positive, hepatitis B core antibody positive, hepatitis  B e antigen negative, hepatitis B e antigen antibody positive.    Ultrasound 7/12/2025:  IMPRESSION:  1. No overt findings of cirrhosis or suspicious liver lesions.    Ultrasound 7/16/2024:  IMPRESSION:  Grossly unremarkable right upper quadrant ultrasound.      Ultrasound 9/20/2023:  Impression   NORMAL SONOGRAPHIC APPEARANCE OF THE RIGHT UPPER QUADRANT.         Ultrasound 4/9/2021 revealed an unremarkable ultrasound of the right upper quadrant.    US 8/31/2020:  IMPRESSION:  Unremarkable ultrasound of the right upper quadrant.    US 12/19/2019:  IMPRESSION:  Unremarkable ultrasound of the right upper quadrant.    US 2/7/2018:  IMPRESSION:  Unremarkable ultrasound of the liver.     US 3/15/2017:  IMPRESSION:  Unremarkable ultrasound of the liver.    US 12/23/2015:  IMPRESSION:     1. Unremarkable right upper quadrant ultrasound.    EGD 9/23/2019:  Impression:         - Normal esophagus.                      - Z-line regular, 40 cm from the incisors.                      - Non-erosive gastritis. Biopsied.                      - Normal examined duodenum.        Assessment/Plan:   Hepatitis B    The patient is referred to me for follow-up evaluation of hepatitis B.    As noted above, she is HBeAg negative.  Apparently, when she was felt to have a flare of her disease in [place] in 2010, she was started on entecavir 0.5 mg daily.  She has remained on that dose since 2010.  She was told that she would need to stay on this medication indefinitely.    Obviously, I cannot go back in time to determine exactly the circumstances of that flare.  It is possible this could have been an immunologic event and did not need therapy.  However, now that she has been on this therapy since 2010 and is doing well, I would just continue it indefinitely.  Obviously, if she were to lose her surface antigen, we could consider trying to stop therapy.  I will check hepatitis B surface antigen and hepatitis B surface antibody annually.    I will  also check hepatitis B DNA annually (although I would do it every 6 months for now in case she gets pregnant).    She should get labs every 6 months as well as an alpha-fetoprotein and ultrasound every 6 months.      I again reminded her of the importance of compliance, especially with the liver cancer screening tests.  She has not been getting her labs or imaging studies on a consistent basis as noted above.    Her hepatitis C antibody was negative.  Her hepatitis D antibody was also negative.    She is immune to hepatitis A.    She should see me back on an annual basis.    She was asking if she can drink alcohol in moderation.  I told her that occasional alcohol should be fine.    I again spoke with her about household and sexual transmission of hepatitis B.  I did recommend that sexual partners be told about her hepatitis B and that they be recommended to discuss vaccination with their care providers.    She is currently exploring in vitro fertilization.  We previously spoke at length about the risk of spread to the unborn child.  In fact, with her hepatitis B DNA being essentially negative, the risk of transmission should be very small.  Either way, the OB and/or neonatologist can give the baby hepatitis B immunoglobulin and vaccines at the time of birth.    We did talk about the safety of hepatitis B medications.  We do not have massive data on the safety of hepatitis B medications in pregnancy.  However, from what we know, there should be little risk associated with the hepatitis B medications during pregnancy.  Also, given the fact that she had a significant flare in 2010, I would not feel comfortable stopping her hepatitis B medication.  I did tell her that theoretically there may be less risk with tenofovir versus Entecavir.  Given the fact that she is now strongly considering in vitro fertilization, I suggested that we switch her to with tenofovir to provide the best safety margin for the fetus.  She is  very nervous about the idea of switching off of her Entecavir which she has been on for so many years.  I told her that realistically it likely would be a reasonable option but that the data still suggest tenofovir may be better.  She will think about this and decide and let us know.    We have never done a FibroScan on her.  I will again recommend that we get one as a baseline study.    Thank you for allowing me to participate in the care of this patient. Please feel free to contact me with any questions regarding their care.     Sincerely,     Abdifatah Emanuel MD, DADA, FAASLD, FACG.   Medical Director, Hepatology  [facility]  [facility]    Professor of Medicine  Division of Gastroenterology and Liver Disease  [employer] [employer] [facility] [employer]    [address] [place], [place] [id number]  Phone: [phone]  Fax: [phone].            This document was generated with a computerized dictation system.  Because of this, there could be errors in grammar and/or content.             [1]   Past Medical History:  Diagnosis Date    Bicornate uterus 05/02/2017    Bicornate uterus    Chronic viral hepatitis B without delta-agent (Multi) 12/13/2022    Chronic hepatitis B    Other specified abnormal findings of blood chemistry 05/15/2019    Increased prolactin level   [2] No past surgical history on file.  [3] No family history on file.  [4] No Known Allergies  [5]   Current Outpatient Medications   Medication Sig Dispense Refill    entecavir (Baraclude) 0.5 mg tablet TAKE 1 TABLET (0.5 MG) ONCE DAILY 90 tablet 3    ibuprofen 600 mg tablet Take 1 tablet (600 mg) by mouth every 6 hours if needed for mild pain (1 - 3) or moderate pain (4 - 6) for up to 5 days. 20 tablet 0    cyclobenzaprine (Flexeril) 10 mg tablet Take 1 tablet (10 mg) by mouth as needed at bedtime for muscle spasms for up to 7 days. (Patient not taking: Reported on 8/3/2025)  7 tablet 0    levothyroxine (Synthroid, Levoxyl) 50 mcg tablet Take 1 tablet (50 mcg) by mouth early in the morning.. Take on an empty stomach at the same time each day, either 30 to 60 minutes prior to breakfast (Patient not taking: Reported on 8/5/2025) 90 tablet 1     No current facility-administered medications for this visit.

## 2025-08-05 NOTE — PATIENT INSTRUCTIONS
Get labs today, in 6 months and in 12 months.    See me back in clinic in 1 year.    Get an ultrasound in 6 and 12 months.    Let us know if you wish to switch from Entecavir to tenofovir.  Theoretically, tenofovir may be safer during pregnancy.    Get a FibroScan ultrasound of the liver.

## 2025-08-09 LAB
AFP-TM SERPL-MCNC: 1.4 NG/ML
ALBUMIN SERPL-MCNC: 4 G/DL (ref 3.6–5.1)
ALBUMIN/GLOB SERPL: 1.3 (CALC) (ref 1–2.5)
ALP SERPL-CCNC: 41 U/L (ref 31–125)
ALT SERPL-CCNC: 15 U/L (ref 6–29)
AST SERPL-CCNC: 14 U/L (ref 10–30)
BILIRUB DIRECT SERPL-MCNC: 0.1 MG/DL
BILIRUB INDIRECT SERPL-MCNC: 0.4 MG/DL (CALC) (ref 0.2–1.2)
BILIRUB SERPL-MCNC: 0.5 MG/DL (ref 0.2–1.2)
GLOBULIN SER CALC-MCNC: 3 G/DL (CALC) (ref 1.9–3.7)
HBV DNA SERPL NAA+PROBE-ACNC: NOT DETECTED IU/ML
HBV DNA SERPL NAA+PROBE-LOG IU: NOT DETECTED LOG IU/ML
HBV SURFACE AB SERPL IA-ACNC: <5 MIU/ML
HBV SURFACE AG SERPL QL IA: REACTIVE
PROT SERPL-MCNC: 7 G/DL (ref 6.1–8.1)

## 2025-08-11 ENCOUNTER — APPOINTMENT (OUTPATIENT)
Dept: GASTROENTEROLOGY | Facility: HOSPITAL | Age: 40
End: 2025-08-11
Payer: COMMERCIAL

## 2025-08-11 ENCOUNTER — CLINICAL SUPPORT (OUTPATIENT)
Dept: GASTROENTEROLOGY | Facility: HOSPITAL | Age: 40
End: 2025-08-11
Payer: COMMERCIAL

## 2025-08-11 DIAGNOSIS — B18.1 CHRONIC HEPATITIS B (MULTI): ICD-10-CM

## 2025-08-11 PROCEDURE — 91200 LIVER ELASTOGRAPHY: CPT | Performed by: INTERNAL MEDICINE
